# Patient Record
Sex: FEMALE | Race: WHITE | NOT HISPANIC OR LATINO | Employment: PART TIME | URBAN - METROPOLITAN AREA
[De-identification: names, ages, dates, MRNs, and addresses within clinical notes are randomized per-mention and may not be internally consistent; named-entity substitution may affect disease eponyms.]

---

## 2020-08-24 ENCOUNTER — APPOINTMENT (OUTPATIENT)
Dept: LAB | Facility: CLINIC | Age: 30
End: 2020-08-24

## 2020-08-24 ENCOUNTER — OCCMED (OUTPATIENT)
Dept: URGENT CARE | Facility: CLINIC | Age: 30
End: 2020-08-24

## 2020-08-24 DIAGNOSIS — Z11.59 SCREENING FOR VIRAL DISEASE: Primary | ICD-10-CM

## 2020-08-24 DIAGNOSIS — Z11.59 SCREENING FOR VIRAL DISEASE: ICD-10-CM

## 2020-08-24 PROCEDURE — 86480 TB TEST CELL IMMUN MEASURE: CPT

## 2020-08-24 PROCEDURE — 36415 COLL VENOUS BLD VENIPUNCTURE: CPT

## 2020-08-24 PROCEDURE — 86787 VARICELLA-ZOSTER ANTIBODY: CPT

## 2020-08-25 LAB — VZV IGG SER IA-ACNC: NORMAL

## 2020-08-26 LAB
GAMMA INTERFERON BACKGROUND BLD IA-ACNC: 0.01 IU/ML
M TB IFN-G BLD-IMP: NEGATIVE
M TB IFN-G CD4+ BCKGRND COR BLD-ACNC: 0 IU/ML
M TB IFN-G CD4+ BCKGRND COR BLD-ACNC: 0 IU/ML
MITOGEN IGNF BCKGRD COR BLD-ACNC: >10 IU/ML

## 2020-09-18 ENCOUNTER — CONSULT (OUTPATIENT)
Dept: SURGERY | Facility: CLINIC | Age: 30
End: 2020-09-18
Payer: COMMERCIAL

## 2020-09-18 VITALS
DIASTOLIC BLOOD PRESSURE: 70 MMHG | SYSTOLIC BLOOD PRESSURE: 118 MMHG | WEIGHT: 135 LBS | HEART RATE: 67 BPM | TEMPERATURE: 97.3 F | HEIGHT: 62 IN | BODY MASS INDEX: 24.84 KG/M2

## 2020-09-18 DIAGNOSIS — L02.214 ABSCESS OF RIGHT GROIN: Primary | ICD-10-CM

## 2020-09-18 PROCEDURE — 99205 OFFICE O/P NEW HI 60 MIN: CPT | Performed by: SURGERY

## 2020-09-18 RX ORDER — DEXTROAMPHETAMINE SACCHARATE, AMPHETAMINE ASPARTATE, DEXTROAMPHETAMINE SULFATE AND AMPHETAMINE SULFATE 5; 5; 5; 5 MG/1; MG/1; MG/1; MG/1
20 TABLET ORAL DAILY
COMMUNITY
End: 2022-05-03

## 2020-09-18 RX ORDER — NORTRIPTYLINE HYDROCHLORIDE 10 MG/1
10 CAPSULE ORAL
COMMUNITY
End: 2021-12-16 | Stop reason: SDUPTHER

## 2020-09-18 NOTE — PROGRESS NOTES
Office Visit - General Surgery  Juancarlos Corrales MRN: [id number]  Encounter: [id number]    Assessment and Plan    Problem List Items Addressed This Visit        Other    Abscess of right groin - Primary     32yo F s/p lymph node excision in right groin on 9/1/20  Represented to her surgeon for aspiration of fluid collection, now with recurrent fluid collection/erythema    Plan:  - I&D and packing of wound in the office today  Procedure yielded 5cc of purely serous fluid  No indication of lymph leak or infected fluid  - Wound packed - to removed packing in 48 hours  Then shower daily, keep wound covered but no air tight  - Return to clinic in 1 week for wound check  Chief Complaint:  Juancarlos Corrales is a 27 y o  female who presents for Abscess    Subjective  Ms Dangelo Encarnacion is a 32yo F who underwent right groin lymph node excision in [place] on 9/1/20  She represented to her surgeon on 9/7 with a collection of fluid in the area that was aspirated in the office  She presents now with wound drainage, pain, and redness  She denies fevers/chills, and otherwise feels well  Past Medical History  Past Medical History:   Diagnosis Date    ADD (attention deficit disorder)     Gastric hypertonus     Lymphadenopathy, inguinal        Past Surgical History  Past Surgical History:   Procedure Laterality Date    SUPERFICIAL LYMPH NODE BIOPSY / EXCISION Right     Right groin       Family History  Family History   Problem Relation Age of Onset    No Known Problems Mother     No Known Problems Father        Medications  Current Outpatient Medications on File Prior to Visit   Medication Sig Dispense Refill    amphetamine-dextroamphetamine (ADDERALL) 20 mg tablet Take 20 mg by mouth daily      nortriptyline (PAMELOR) 10 mg capsule Take 10 mg by mouth daily at bedtime       No current facility-administered medications on file prior to visit          Allergies  Allergies   Allergen Reactions    Cefaclor        Review of Systems   Constitutional: Negative for chills, fatigue and fever  HENT: Negative for ear pain, facial swelling, sinus pressure and sinus pain  Eyes: Negative for pain  Respiratory: Negative for cough, shortness of breath and wheezing  Cardiovascular: Negative for chest pain  Gastrointestinal: Negative for abdominal pain, constipation, diarrhea, nausea and vomiting  Endocrine: Negative for cold intolerance and heat intolerance  Genitourinary: Negative for dysuria and flank pain  Right groin pain   Musculoskeletal: Negative for back pain and neck pain  Skin: Negative for wound  Neurological: Negative for syncope, facial asymmetry, light-headedness and numbness  Psychiatric/Behavioral: Negative for behavioral problems, confusion and suicidal ideas  Objective  Vitals:    09/18/20 1229   BP: 118/70   Pulse: 67   Temp: (!) 97 3 °F (36 3 °C)         Physical Exam  Vitals signs and nursing note reviewed  Exam conducted with a chaperone present  Constitutional:       General: She is not in acute distress  Appearance: Normal appearance  She is not toxic-appearing  HENT:      Head: Normocephalic and atraumatic  Mouth/Throat:      Mouth: Mucous membranes are moist    Eyes:      Extraocular Movements: Extraocular movements intact  Pupils: Pupils are equal, round, and reactive to light  Neck:      Musculoskeletal: Normal range of motion and neck supple  Cardiovascular:      Rate and Rhythm: Normal rate and regular rhythm  Pulses: Normal pulses  Pulmonary:      Effort: Pulmonary effort is normal  No respiratory distress  Breath sounds: Normal breath sounds  No wheezing  Abdominal:      General: There is no distension  Palpations: Abdomen is soft  There is no mass  Tenderness: There is no abdominal tenderness  There is no guarding or rebound  Hernia: No hernia is present            Comments: Right groin incision 3cm in length with surrounding mild erythema  Superior aspect of the incision open with some seropurulent drainage  Area of fluctuance appreciated below the incision   Musculoskeletal: Normal range of motion  General: No swelling or deformity  Right lower leg: No edema  Left lower leg: No edema  Skin:     General: Skin is warm and dry  Coloration: Skin is not jaundiced  Neurological:      General: No focal deficit present  Mental Status: She is alert and oriented to person, place, and time  Psychiatric:         Mood and Affect: Mood normal          Behavior: Behavior normal        Incision and Drainage    Date/Time: 9/18/2020 12:55 PM  Performed by: Estela Guzmán MD  Authorized by: Estela Guzmán MD     Patient location:  Clinic  Other Assisting Provider: Yes (comment) (Valerie Mccoy)    Consent:     Consent obtained:  Written    Consent given by:  Patient    Risks discussed:  Bleeding, infection, incomplete drainage and pain  Universal protocol:     Procedure explained and questions answered to patient or proxy's satisfaction: yes      Relevant documents present and verified: yes      Patient identity confirmed:  Verbally with patient  Location:     Type:  Surgical wound infection    Size:  3cm    Location: Right Groin  Pre-procedure details:     Skin preparation:  Chloraprep  Anesthesia (see MAR for exact dosages): Anesthesia method:  Local infiltration    Local anesthetic:  Lidocaine 1% w/o epi  Procedure details:     Complexity:  Simple    Needle aspiration: no      Incision types: Other (comment) (Old surgical incision opened)    Scalpel blade:  11    Approach:  Open    Incision depth:  Subcutaneous    Wound management:  Probed and deloculated and irrigated with saline    Drainage:  Serous    Drainage amount: 5cc  Packing materials:  1/4 in gauze  Post-procedure details:     Patient tolerance of procedure:   Tolerated well, no immediate complications

## 2020-09-18 NOTE — ASSESSMENT & PLAN NOTE
32yo F s/p lymph node excision in right groin on 9/1/20  Represented to her surgeon for aspiration of fluid collection, now with recurrent fluid collection/erythema    Plan:  - I&D and packing of wound in the office today  Procedure yielded 5cc of purely serous fluid  No indication of lymph leak or infected fluid  - Wound packed - to removed packing in 48 hours  Then shower daily, keep wound covered but no air tight  - Return to clinic in 1 week for wound check

## 2020-09-21 ENCOUNTER — TELEPHONE (OUTPATIENT)
Dept: SURGERY | Facility: CLINIC | Age: 30
End: 2020-09-21

## 2020-09-21 NOTE — TELEPHONE ENCOUNTER
Patient had I&D done on right groin wound  Today she called with concern that there was a little pus at the incision  I spoke w/ Dr Kina Welch and he stated that was fine & to have her shower w/ soap & water to clean area  She also stated she was feeling ache & would it be from the wound (infection) she was told to take Tylenol or Ibuprofen or see her PCP  Patient understood

## 2020-09-25 ENCOUNTER — OFFICE VISIT (OUTPATIENT)
Dept: SURGERY | Facility: CLINIC | Age: 30
End: 2020-09-25

## 2020-09-25 VITALS — HEIGHT: 62 IN | TEMPERATURE: 98 F | BODY MASS INDEX: 24.99 KG/M2 | HEART RATE: 112 BPM | WEIGHT: 135.8 LBS

## 2020-09-25 DIAGNOSIS — L02.214 ABSCESS OF RIGHT GROIN: Primary | ICD-10-CM

## 2020-09-25 PROCEDURE — 99024 POSTOP FOLLOW-UP VISIT: CPT | Performed by: SURGERY

## 2020-09-25 NOTE — PROGRESS NOTES
Office Visit - General Surgery  Radha Llanos MRN: [id number]  Encounter: [id number]    Assessment and Plan    Problem List Items Addressed This Visit        Other    Abscess of right groin - Primary     S/p I&D last week, improved  Plan:  - No longer need to dress the area, Keep open to air if possible  - No follow-up needed, can call with any questions or concerns  Chief Complaint:  Radha  is a 27 y o  female who presents for Follow-up (f/u I&D groin area )    Subjective  Ms Milan Hunter is doing well following I&D of her Right groin fluid collection last week in the office  She states that she had some drainage over the beginning half of the week that has dropped off  No significant redness in the area  Denies fevers or chills       Past Medical History  Past Medical History:   Diagnosis Date    ADD (attention deficit disorder)     Gastric hypertonus     Lymphadenopathy, inguinal        Past Surgical History  Past Surgical History:   Procedure Laterality Date    SUPERFICIAL LYMPH NODE BIOPSY / EXCISION Right     Right groin       Family History  Family History   Problem Relation Age of Onset    No Known Problems Mother     No Known Problems Father        Social History  Social History     Socioeconomic History    Marital status: /Civil Union     Spouse name: None    Number of children: None    Years of education: None    Highest education level: None   Occupational History    None   Social Needs    Financial resource strain: None    Food insecurity     Worry: None     Inability: None    Transportation needs     Medical: None     Non-medical: None   Tobacco Use    Smoking status: Never Smoker    Smokeless tobacco: Never Used   Substance and Sexual Activity    Alcohol use: Yes     Comment: Occassionally    Drug use: Never    Sexual activity: Yes   Lifestyle    Physical activity     Days per week: None     Minutes per session: None    Stress: None Relationships    Social connections     Talks on phone: None     Gets together: None     Attends Confucianist service: None     Active member of club or organization: None     Attends meetings of clubs or organizations: None     Relationship status: None    Intimate partner violence     Fear of current or ex partner: None     Emotionally abused: None     Physically abused: None     Forced sexual activity: None   Other Topics Concern    None   Social History Narrative    None        Medications  Current Outpatient Medications on File Prior to Visit   Medication Sig Dispense Refill    amphetamine-dextroamphetamine (ADDERALL) 20 mg tablet Take 20 mg by mouth daily      nortriptyline (PAMELOR) 10 mg capsule Take 10 mg by mouth daily at bedtime       No current facility-administered medications on file prior to visit  Allergies  Allergies   Allergen Reactions    Cefaclor        Review of Systems   Constitutional: Negative for chills, fatigue and fever  HENT: Negative for ear pain, facial swelling, sinus pressure and sinus pain  Eyes: Negative for pain  Respiratory: Negative for cough, shortness of breath and wheezing  Cardiovascular: Negative for chest pain  Gastrointestinal: Negative for abdominal pain, constipation, diarrhea, nausea and vomiting  Endocrine: Negative for cold intolerance and heat intolerance  Genitourinary: Negative for dysuria and flank pain  Musculoskeletal: Negative for back pain and neck pain  Skin: Negative for wound  Neurological: Negative for syncope, facial asymmetry, light-headedness and numbness  Psychiatric/Behavioral: Negative for behavioral problems, confusion and suicidal ideas  Objective  Vitals:    09/25/20 1126   Pulse: (!) 112   Temp: 98 °F (36 7 °C)       Physical Exam  Vitals signs and nursing note reviewed  Constitutional:       General: She is not in acute distress  Appearance: Normal appearance  She is not toxic-appearing     HENT: Head: Normocephalic and atraumatic  Mouth/Throat:      Mouth: Mucous membranes are moist    Eyes:      Extraocular Movements: Extraocular movements intact  Pupils: Pupils are equal, round, and reactive to light  Neck:      Musculoskeletal: Normal range of motion and neck supple  Cardiovascular:      Rate and Rhythm: Normal rate and regular rhythm  Pulses: Normal pulses  Pulmonary:      Effort: Pulmonary effort is normal  No respiratory distress  Breath sounds: Normal breath sounds  No wheezing  Abdominal:      General: There is no distension  Palpations: Abdomen is soft  There is no mass  Tenderness: There is no abdominal tenderness  There is no guarding or rebound  Hernia: No hernia is present  Genitourinary:     Comments: Right groin incision minimally open at the superior apex  No fluctuance felt  Mild amount of induration in the area, no erythema  Musculoskeletal: Normal range of motion  General: No swelling or deformity  Right lower leg: No edema  Left lower leg: No edema  Skin:     General: Skin is warm and dry  Coloration: Skin is not jaundiced  Neurological:      General: No focal deficit present  Mental Status: She is alert and oriented to person, place, and time     Psychiatric:         Mood and Affect: Mood normal          Behavior: Behavior normal

## 2020-09-25 NOTE — ASSESSMENT & PLAN NOTE
S/p I&D last week, improved  Plan:  - No longer need to dress the area, Keep open to air if possible  - No follow-up needed, can call with any questions or concerns

## 2020-10-05 ENCOUNTER — TELEPHONE (OUTPATIENT)
Dept: SURGERY | Facility: CLINIC | Age: 30
End: 2020-10-05

## 2020-10-06 ENCOUNTER — OFFICE VISIT (OUTPATIENT)
Dept: SURGERY | Facility: CLINIC | Age: 30
End: 2020-10-06

## 2020-10-06 VITALS — HEART RATE: 74 BPM | BODY MASS INDEX: 25.06 KG/M2 | TEMPERATURE: 97.4 F | WEIGHT: 137 LBS

## 2020-10-06 DIAGNOSIS — L02.214 ABSCESS OF RIGHT GROIN: Primary | ICD-10-CM

## 2020-10-06 PROCEDURE — 99024 POSTOP FOLLOW-UP VISIT: CPT | Performed by: SURGERY

## 2020-12-21 ENCOUNTER — IMMUNIZATIONS (OUTPATIENT)
Dept: FAMILY MEDICINE CLINIC | Facility: HOSPITAL | Age: 30
End: 2020-12-21
Payer: COMMERCIAL

## 2020-12-21 DIAGNOSIS — Z23 ENCOUNTER FOR IMMUNIZATION: ICD-10-CM

## 2020-12-21 PROCEDURE — 91300 SARS-COV-2 / COVID-19 MRNA VACCINE (PFIZER-BIONTECH) 30 MCG: CPT

## 2020-12-21 PROCEDURE — 0001A SARS-COV-2 / COVID-19 MRNA VACCINE (PFIZER-BIONTECH) 30 MCG: CPT

## 2021-01-09 ENCOUNTER — IMMUNIZATIONS (OUTPATIENT)
Dept: FAMILY MEDICINE CLINIC | Facility: HOSPITAL | Age: 31
End: 2021-01-09

## 2021-01-09 DIAGNOSIS — Z23 ENCOUNTER FOR IMMUNIZATION: ICD-10-CM

## 2021-01-09 PROCEDURE — 0002A SARS-COV-2 / COVID-19 MRNA VACCINE (PFIZER-BIONTECH) 30 MCG: CPT

## 2021-01-09 PROCEDURE — 91300 SARS-COV-2 / COVID-19 MRNA VACCINE (PFIZER-BIONTECH) 30 MCG: CPT

## 2021-10-07 ENCOUNTER — IMMUNIZATIONS (OUTPATIENT)
Dept: FAMILY MEDICINE CLINIC | Facility: HOSPITAL | Age: 31
End: 2021-10-07

## 2021-10-07 DIAGNOSIS — Z23 ENCOUNTER FOR IMMUNIZATION: Primary | ICD-10-CM

## 2021-10-07 PROCEDURE — 0001A SARS-COV-2 / COVID-19 MRNA VACCINE (PFIZER-BIONTECH) 30 MCG: CPT

## 2021-10-07 PROCEDURE — 91300 SARS-COV-2 / COVID-19 MRNA VACCINE (PFIZER-BIONTECH) 30 MCG: CPT

## 2021-10-19 ENCOUNTER — APPOINTMENT (EMERGENCY)
Dept: RADIOLOGY | Facility: HOSPITAL | Age: 31
End: 2021-10-19
Payer: COMMERCIAL

## 2021-10-19 ENCOUNTER — HOSPITAL ENCOUNTER (EMERGENCY)
Facility: HOSPITAL | Age: 31
Discharge: HOME | End: 2021-10-19
Attending: EMERGENCY MEDICINE | Admitting: EMERGENCY MEDICINE
Payer: COMMERCIAL

## 2021-10-19 VITALS
HEART RATE: 80 BPM | DIASTOLIC BLOOD PRESSURE: 77 MMHG | RESPIRATION RATE: 16 BRPM | TEMPERATURE: 98.7 F | SYSTOLIC BLOOD PRESSURE: 126 MMHG | OXYGEN SATURATION: 100 %

## 2021-10-19 DIAGNOSIS — R42 DIZZINESS: ICD-10-CM

## 2021-10-19 DIAGNOSIS — Z87.19 HISTORY OF IBS: ICD-10-CM

## 2021-10-19 DIAGNOSIS — R07.9 CHEST PAIN: Primary | ICD-10-CM

## 2021-10-19 LAB
ALBUMIN SERPL BCP-MCNC: 4.3 G/DL (ref 3.5–5)
ALP SERPL-CCNC: 58 U/L (ref 46–116)
ALT SERPL W P-5'-P-CCNC: 24 U/L (ref 12–78)
ANION GAP SERPL CALCULATED.3IONS-SCNC: 11 MMOL/L (ref 4–13)
AST SERPL W P-5'-P-CCNC: 22 U/L (ref 5–45)
BASOPHILS # BLD AUTO: 0.06 THOUSANDS/ΜL (ref 0–0.1)
BASOPHILS NFR BLD AUTO: 1 % (ref 0–1)
BILIRUB SERPL-MCNC: 0.93 MG/DL (ref 0.2–1)
BUN SERPL-MCNC: 11 MG/DL (ref 5–25)
CALCIUM SERPL-MCNC: 8.9 MG/DL (ref 8.3–10.1)
CHLORIDE SERPL-SCNC: 105 MMOL/L (ref 100–108)
CO2 SERPL-SCNC: 25 MMOL/L (ref 21–32)
CREAT SERPL-MCNC: 0.66 MG/DL (ref 0.6–1.3)
D DIMER PPP FEU-MCNC: <0.27 UG/ML FEU
EOSINOPHIL # BLD AUTO: 0.21 THOUSAND/ΜL (ref 0–0.61)
EOSINOPHIL NFR BLD AUTO: 4 % (ref 0–6)
ERYTHROCYTE [DISTWIDTH] IN BLOOD BY AUTOMATED COUNT: 12.8 % (ref 11.6–15.1)
EXT PREG TEST URINE: NEGATIVE
EXT. CONTROL ED NAV: NORMAL
GFR SERPL CREATININE-BSD FRML MDRD: 118 ML/MIN/1.73SQ M
GLUCOSE SERPL-MCNC: 92 MG/DL (ref 65–140)
HCT VFR BLD AUTO: 41.1 % (ref 34.8–46.1)
HGB BLD-MCNC: 13.7 G/DL (ref 11.5–15.4)
IMM GRANULOCYTES # BLD AUTO: 0.01 THOUSAND/UL (ref 0–0.2)
IMM GRANULOCYTES NFR BLD AUTO: 0 % (ref 0–2)
LYMPHOCYTES # BLD AUTO: 1.98 THOUSANDS/ΜL (ref 0.6–4.47)
LYMPHOCYTES NFR BLD AUTO: 33 % (ref 14–44)
MCH RBC QN AUTO: 29.1 PG (ref 26.8–34.3)
MCHC RBC AUTO-ENTMCNC: 33.3 G/DL (ref 31.4–37.4)
MCV RBC AUTO: 87 FL (ref 82–98)
MONOCYTES # BLD AUTO: 0.33 THOUSAND/ΜL (ref 0.17–1.22)
MONOCYTES NFR BLD AUTO: 5 % (ref 4–12)
NEUTROPHILS # BLD AUTO: 3.47 THOUSANDS/ΜL (ref 1.85–7.62)
NEUTS SEG NFR BLD AUTO: 57 % (ref 43–75)
NRBC BLD AUTO-RTO: 0 /100 WBCS
PLATELET # BLD AUTO: 316 THOUSANDS/UL (ref 149–390)
PMV BLD AUTO: 8.8 FL (ref 8.9–12.7)
POTASSIUM SERPL-SCNC: 3.8 MMOL/L (ref 3.5–5.3)
PROT SERPL-MCNC: 7.5 G/DL (ref 6.4–8.2)
RBC # BLD AUTO: 4.71 MILLION/UL (ref 3.81–5.12)
SODIUM SERPL-SCNC: 141 MMOL/L (ref 136–145)
TROPONIN I SERPL-MCNC: <0.02 NG/ML
WBC # BLD AUTO: 6.06 THOUSAND/UL (ref 4.31–10.16)

## 2021-10-19 PROCEDURE — 99285 EMERGENCY DEPT VISIT HI MDM: CPT

## 2021-10-19 PROCEDURE — 80053 COMPREHEN METABOLIC PANEL: CPT | Performed by: EMERGENCY MEDICINE

## 2021-10-19 PROCEDURE — U0005 INFEC AGEN DETEC AMPLI PROBE: HCPCS | Performed by: EMERGENCY MEDICINE

## 2021-10-19 PROCEDURE — U0003 INFECTIOUS AGENT DETECTION BY NUCLEIC ACID (DNA OR RNA); SEVERE ACUTE RESPIRATORY SYNDROME CORONAVIRUS 2 (SARS-COV-2) (CORONAVIRUS DISEASE [COVID-19]), AMPLIFIED PROBE TECHNIQUE, MAKING USE OF HIGH THROUGHPUT TECHNOLOGIES AS DESCRIBED BY CMS-2020-01-R: HCPCS | Performed by: EMERGENCY MEDICINE

## 2021-10-19 PROCEDURE — 85025 COMPLETE CBC W/AUTO DIFF WBC: CPT | Performed by: EMERGENCY MEDICINE

## 2021-10-19 PROCEDURE — 85379 FIBRIN DEGRADATION QUANT: CPT | Performed by: EMERGENCY MEDICINE

## 2021-10-19 PROCEDURE — 71045 X-RAY EXAM CHEST 1 VIEW: CPT

## 2021-10-19 PROCEDURE — 36415 COLL VENOUS BLD VENIPUNCTURE: CPT

## 2021-10-19 PROCEDURE — 84484 ASSAY OF TROPONIN QUANT: CPT | Performed by: EMERGENCY MEDICINE

## 2021-10-19 PROCEDURE — 99285 EMERGENCY DEPT VISIT HI MDM: CPT | Performed by: EMERGENCY MEDICINE

## 2021-10-19 PROCEDURE — 93005 ELECTROCARDIOGRAM TRACING: CPT

## 2021-10-19 PROCEDURE — 81025 URINE PREGNANCY TEST: CPT | Performed by: EMERGENCY MEDICINE

## 2021-10-19 RX ORDER — NORTRIPTYLINE HYDROCHLORIDE 10 MG/1
10 CAPSULE ORAL
Qty: 30 CAPSULE | Refills: 0 | Status: SHIPPED | OUTPATIENT
Start: 2021-10-19 | End: 2021-12-16 | Stop reason: SDUPTHER

## 2021-10-20 LAB
ATRIAL RATE: 81 BPM
P AXIS: 99 DEGREES
PR INTERVAL: 150 MS
QRS AXIS: 65 DEGREES
QRSD INTERVAL: 80 MS
QT INTERVAL: 362 MS
QTC INTERVAL: 407 MS
SARS-COV-2 RNA RESP QL NAA+PROBE: NEGATIVE
T WAVE AXIS: 73 DEGREES
VENTRICULAR RATE: 76 BPM

## 2021-10-20 PROCEDURE — 93010 ELECTROCARDIOGRAM REPORT: CPT | Performed by: INTERNAL MEDICINE

## 2021-12-16 ENCOUNTER — OFFICE VISIT (OUTPATIENT)
Dept: GASTROENTEROLOGY | Facility: CLINIC | Age: 31
End: 2021-12-16
Payer: COMMERCIAL

## 2021-12-16 VITALS
HEART RATE: 91 BPM | WEIGHT: 132 LBS | BODY MASS INDEX: 24.29 KG/M2 | HEIGHT: 62 IN | SYSTOLIC BLOOD PRESSURE: 137 MMHG | DIASTOLIC BLOOD PRESSURE: 89 MMHG

## 2021-12-16 DIAGNOSIS — K59.1 FUNCTIONAL DIARRHEA: ICD-10-CM

## 2021-12-16 DIAGNOSIS — K62.5 RECTAL BLEEDING: Primary | ICD-10-CM

## 2021-12-16 DIAGNOSIS — K22.89 ESOPHAGEAL PAIN: ICD-10-CM

## 2021-12-16 DIAGNOSIS — Z80.0 FAMILY HISTORY OF COLON CANCER: ICD-10-CM

## 2021-12-16 PROCEDURE — 99214 OFFICE O/P EST MOD 30 MIN: CPT | Performed by: INTERNAL MEDICINE

## 2021-12-16 RX ORDER — NORTRIPTYLINE HYDROCHLORIDE 10 MG/1
10 CAPSULE ORAL
Qty: 90 CAPSULE | Refills: 3 | Status: SHIPPED | OUTPATIENT
Start: 2021-12-16

## 2022-01-04 ENCOUNTER — TELEPHONE (OUTPATIENT)
Dept: GASTROENTEROLOGY | Facility: CLINIC | Age: 32
End: 2022-01-04

## 2022-01-18 DIAGNOSIS — K22.89 ESOPHAGEAL PAIN: ICD-10-CM

## 2022-01-18 NOTE — TELEPHONE ENCOUNTER
PT WITH RECENT OFFICE VISIT, REQUESTING REFILL FOR NORTRIPTYLINE 10MG DAILY AT  TO [place] IN [place]  SPOKE WITH PT PHARMACY, SHE DOES HAVE REFILLS AVAILABLE, PT INFORMED THEY WILL PREPARE A REFILL FOR HER

## 2022-01-27 ENCOUNTER — LAB (OUTPATIENT)
Dept: LAB | Facility: HOSPITAL | Age: 32
End: 2022-01-27
Attending: INTERNAL MEDICINE
Payer: COMMERCIAL

## 2022-01-27 DIAGNOSIS — K59.1 FUNCTIONAL DIARRHEA: ICD-10-CM

## 2022-01-27 PROCEDURE — 86364 TISS TRNSGLTMNASE EA IG CLAS: CPT

## 2022-01-27 PROCEDURE — 86231 EMA EACH IG CLASS: CPT

## 2022-01-27 PROCEDURE — 86258 DGP ANTIBODY EACH IG CLASS: CPT

## 2022-01-27 PROCEDURE — 82784 ASSAY IGA/IGD/IGG/IGM EACH: CPT

## 2022-01-27 PROCEDURE — 36415 COLL VENOUS BLD VENIPUNCTURE: CPT

## 2022-01-28 LAB
ENDOMYSIUM IGA SER QL: NEGATIVE
GLIADIN PEPTIDE IGA SER-ACNC: 4 UNITS (ref 0–19)
GLIADIN PEPTIDE IGG SER-ACNC: 2 UNITS (ref 0–19)
IGA SERPL-MCNC: 155 MG/DL (ref 87–352)
TTG IGA SER-ACNC: <2 U/ML (ref 0–3)
TTG IGG SER-ACNC: <2 U/ML (ref 0–5)

## 2022-02-04 ENCOUNTER — HOSPITAL ENCOUNTER (OUTPATIENT)
Dept: GASTROENTEROLOGY | Facility: AMBULATORY SURGERY CENTER | Age: 32
Discharge: HOME | End: 2022-02-04
Payer: COMMERCIAL

## 2022-02-04 ENCOUNTER — ANESTHESIA (OUTPATIENT)
Dept: GASTROENTEROLOGY | Facility: AMBULATORY SURGERY CENTER | Age: 32
End: 2022-02-04

## 2022-02-04 ENCOUNTER — ANESTHESIA EVENT (OUTPATIENT)
Dept: GASTROENTEROLOGY | Facility: AMBULATORY SURGERY CENTER | Age: 32
End: 2022-02-04

## 2022-02-04 VITALS
HEIGHT: 62 IN | TEMPERATURE: 98.1 F | OXYGEN SATURATION: 99 % | HEART RATE: 65 BPM | WEIGHT: 136 LBS | SYSTOLIC BLOOD PRESSURE: 116 MMHG | BODY MASS INDEX: 25.03 KG/M2 | DIASTOLIC BLOOD PRESSURE: 73 MMHG | RESPIRATION RATE: 18 BRPM

## 2022-02-04 DIAGNOSIS — K62.5 RECTAL BLEEDING: ICD-10-CM

## 2022-02-04 DIAGNOSIS — K22.89 ESOPHAGEAL PAIN: ICD-10-CM

## 2022-02-04 LAB
EXT PREGNANCY TEST URINE: NEGATIVE
EXT. CONTROL: NORMAL

## 2022-02-04 PROCEDURE — 45380 COLONOSCOPY AND BIOPSY: CPT | Performed by: INTERNAL MEDICINE

## 2022-02-04 PROCEDURE — 00813 ANES UPR LWR GI NDSC PX: CPT | Performed by: NURSE ANESTHETIST, CERTIFIED REGISTERED

## 2022-02-04 PROCEDURE — 43239 EGD BIOPSY SINGLE/MULTIPLE: CPT | Performed by: INTERNAL MEDICINE

## 2022-02-04 RX ORDER — LIDOCAINE HYDROCHLORIDE 10 MG/ML
INJECTION, SOLUTION EPIDURAL; INFILTRATION; INTRACAUDAL; PERINEURAL AS NEEDED
Status: DISCONTINUED | OUTPATIENT
Start: 2022-02-04 | End: 2022-02-04

## 2022-02-04 RX ORDER — SODIUM CHLORIDE 9 MG/ML
125 INJECTION, SOLUTION INTRAVENOUS CONTINUOUS
Status: DISCONTINUED | OUTPATIENT
Start: 2022-02-04 | End: 2022-02-04

## 2022-02-04 RX ORDER — SODIUM CHLORIDE 9 MG/ML
50 INJECTION, SOLUTION INTRAVENOUS CONTINUOUS
Status: DISCONTINUED | OUTPATIENT
Start: 2022-02-04 | End: 2022-02-08 | Stop reason: HOSPADM

## 2022-02-04 RX ORDER — PROPOFOL 10 MG/ML
INJECTION, EMULSION INTRAVENOUS AS NEEDED
Status: DISCONTINUED | OUTPATIENT
Start: 2022-02-04 | End: 2022-02-04

## 2022-02-04 RX ADMIN — PROPOFOL 100 MG: 10 INJECTION, EMULSION INTRAVENOUS at 11:01

## 2022-02-04 RX ADMIN — LIDOCAINE HYDROCHLORIDE 100 MG: 10 INJECTION, SOLUTION EPIDURAL; INFILTRATION; INTRACAUDAL; PERINEURAL at 10:34

## 2022-02-04 RX ADMIN — SODIUM CHLORIDE: 9 INJECTION, SOLUTION INTRAVENOUS at 10:29

## 2022-02-04 RX ADMIN — PROPOFOL 50 MG: 10 INJECTION, EMULSION INTRAVENOUS at 10:51

## 2022-02-04 RX ADMIN — PROPOFOL 100 MG: 10 INJECTION, EMULSION INTRAVENOUS at 10:45

## 2022-02-04 RX ADMIN — PROPOFOL 150 MG: 10 INJECTION, EMULSION INTRAVENOUS at 10:34

## 2022-02-04 RX ADMIN — PROPOFOL 100 MG: 10 INJECTION, EMULSION INTRAVENOUS at 10:40

## 2022-02-04 NOTE — ANESTHESIA PREPROCEDURE EVALUATION
Procedure:  COLONOSCOPY  EGD    Relevant Problems   Other   (+) Lymphadenopathy, inguinal        Physical Exam    Airway    Mallampati score: II  TM Distance: >3 FB  Neck ROM: full     Dental   No notable dental hx     Cardiovascular  Rhythm: regular, Rate: normal, Cardiovascular exam normal    Pulmonary  Pulmonary exam normal     Other Findings        Anesthesia Plan  ASA Score- 2     Anesthesia Type- IV sedation with anesthesia with ASA Monitors  Additional Monitors:   Airway Plan:           Plan Factors-Exercise tolerance (METS): >4 METS  Chart reviewed  Patient summary reviewed  Patient is not a current smoker  Patient not instructed to abstain from smoking on day of procedure  Patient did not smoke on day of surgery  Induction-     Postoperative Plan-     Informed Consent- Anesthetic plan and risks discussed with patient

## 2022-02-04 NOTE — ANESTHESIA POSTPROCEDURE EVALUATION
Post-Op Assessment Note    CV Status:  Stable  Pain Score: 0    Pain management: adequate     Mental Status:  Alert and awake   Hydration Status:  Euvolemic   PONV Controlled:  Controlled   Airway Patency:  Patent      Post Op Vitals Reviewed: Yes      Staff: CRNA         No complications documented      BP   110/58   Temp      Pulse  75   Resp   18   SpO2   99

## 2022-02-04 NOTE — H&P
History and Physical - [facility]  Andrea Waltonceline 32 y o  female MRN: [id number]                  HPI: Joselyn Cisse is a 32y o  year old female who presents for EGD and colonoscopy for history of esophageal pain as well as rectal bleeding  REVIEW OF SYSTEMS: Per the HPI, and otherwise unremarkable      Historical Information   Past Medical History:   Diagnosis Date    ADD (attention deficit disorder)     Anxiety     Asthma     seasonal, with URI, and cold weather excercise    Colon polyp     Depression     Epigastric pain     occasional nausea    Esophageal pain     occasional    Gastric hypertonus     GERD (gastroesophageal reflux disease)     occasional    Lymphadenopathy, inguinal     PONV (postoperative nausea and vomiting)     Raynaud's disease     Rectal bleeding      Past Surgical History:   Procedure Laterality Date    BUNIONECTOMY      COLONOSCOPY      SUPERFICIAL LYMPH NODE BIOPSY / EXCISION Right     Right groin    UPPER GASTROINTESTINAL ENDOSCOPY       Social History   Social History     Substance and Sexual Activity   Alcohol Use Yes    Comment: occasional     Social History     Substance and Sexual Activity   Drug Use Yes    Types: Marijuana    Comment: medical marijuana-flower not oil     Social History     Tobacco Use   Smoking Status Current Some Day Smoker    Types: Cigars   Smokeless Tobacco Never Used   Tobacco Comment    no cigar today     Family History   Problem Relation Age of Onset    Colon polyps Mother     No Known Problems Father     Colon cancer Maternal Grandfather        Meds/Allergies       Current Outpatient Medications:     Ascorbic Acid (VITAMIN C PO)    Calcium Carb-Cholecalciferol (CALCIUM+D3 PO)    lisdexamfetamine (VYVANSE) 30 MG capsule    nortriptyline (PAMELOR) 10 mg capsule    Omega-3 Fatty Acids (SV FISH OIL PO)    Probiotic Product (PROBIOTIC-10 PO)    VITAMIN A PO    amphetamine-dextroamphetamine (ADDERALL) 20 mg tablet    Current Facility-Administered Medications:     sodium chloride 0 9 % infusion, 125 mL/hr, Intravenous, Continuous    Allergies   Allergen Reactions    Cefaclor Hives     Hives around mouth       Objective     /66   Pulse 81   Temp 98 1 °F (36 7 °C) (Skin)   Resp 18   Ht 5' 2" (1 575 m)   Wt 61 7 kg (136 lb)   LMP 01/07/2022 (Approximate)   SpO2 100%   BMI 24 87 kg/m²       PHYSICAL EXAM    Gen: NAD  Head: NCAT  CV: RRR  CHEST: Clear  ABD: soft, NT/ND  EXT: no edema      ASSESSMENT/PLAN:  This is a 32y o  year old female here for EGD and colonoscopy, and she is stable and optimized for her procedure

## 2022-02-04 NOTE — DISCHARGE INSTRUCTIONS
Upper Endoscopy and Colonoscopy   WHAT YOU NEED TO KNOW:   An upper endoscopy is also called an upper gastrointestinal (GI) endoscopy, or an esophagogastroduodenoscopy (EGD)  It is a procedure to examine the inside of your esophagus, stomach, and duodenum (first part of the small intestine) with a scope  You may feel bloated, gassy, or have some abdominal discomfort after your procedure  Your throat may be sore for 24 to 36 hours  You may burp or pass gas from air that is still inside your body  A colonoscopy is a procedure to examine the inside of your colon (intestine) with a scope  Polyps or tissue growths may have been removed during your colonoscopy  It is normal to feel bloated and to have some abdominal discomfort  You should be passing gas  If you have hemorrhoids or you had polyps removed, you may have a small amount of bleeding  DISCHARGE INSTRUCTIONS:   Seek care immediately if:   · You have sudden, severe abdominal pain  · You have problems swallowing  · You have a large amount of black, sticky bowel movements or blood in your bowel movements  · You have sudden trouble breathing  · You feel weak, lightheaded, or faint or your heart beats faster than normal for you  Contact your healthcare provider if:   · You have a fever and chills  · You have nausea or are vomiting  · Your abdomen is bloated or feels full and hard  · You have abdominal pain  · You have a large amount of black, sticky bowel movements or blood in your bowel movements  · You have not had a bowel movement for 3 days after your procedure  · You have rash or hives  · You have questions or concerns about your procedure  Activity:   ·       Do not lift, strain, or run for 24 hours after your procedure  ·       Rest after your procedure  You have been given medicine to relax you  Do not drive or make important decisions until the day after your procedure   Return to your normal activity as directed  ·       Relieve gas and discomfort from bloating by lying on your right side with a heating pad on your abdomen  You may need to take short walks to help the gas move out  Eat small meals until bloating is relieved  Follow up with your healthcare provider as directed: Write down your questions so you remember to ask them during your visits  If you take a blood thinner, please review the specific instructions from your endoscopist about when you should resume it  These can be found in the Recommendation and Your Medication list sections of this After Visit Summary  GERD (Gastroesophageal Reflux Disease)   WHAT YOU NEED TO KNOW:   What is gastroesophageal reflux disease (GERD)? GERD is reflux that occurs more than twice a week for a few weeks  Reflux means acid and food in the stomach back up into the esophagus  It usually causes heartburn and other symptoms  GERD can cause other health problems over time if it is not treated  What causes GERD? GERD often occurs when the lower muscle (sphincter) of the esophagus does not close properly  The sphincter normally opens to let food into the stomach  It then closes to keep food and stomach acid in the stomach  If the sphincter does not close properly, stomach acid and food back up (reflux) into the esophagus  The following may increase your risk for GERD:  · Certain foods such as spicy foods, chocolate, foods that contain caffeine, peppermint, and fried foods    · Hiatal hernia    · Certain medicines such as calcium channel blockers (used to treat high blood pressure), allergy medicines, sedatives, or antidepressants    · Pregnancy or obesity    · Lying down after a meal    · Drinking alcohol or smoking    What are the signs and symptoms of GERD?    · Heartburn (burning pain in your chest)    · Pain after meals that spreads to your neck, jaw, or shoulder    · Pain that gets better when you change positions    · Bitter or acid taste in your mouth    · A dry cough    · Trouble swallowing or pain with swallowing    · Hoarseness or a sore throat    · Burping or hiccups    · Feeling full soon after you start eating    How is GERD diagnosed? Your healthcare provider will ask about your symptoms and when they started  Tell him or her about other medical conditions you have, your eating habits, and your activities  You may also need any of the following:  · The amount of acid  in your esophagus and stomach may be checked  A small probe is used to check the amount  · An endoscopy  is a procedure used to look at the inside of your esophagus and stomach  An endoscope is a bendable tube with a light and camera on the end  Your healthcare provider may remove a small sample of tissue and send it to a lab for tests  · X-ray  pictures may be taken of your stomach and intestines (bowel)  You may be given a chalky liquid to drink before the pictures are taken  This liquid helps your stomach and intestines show up better on the x-rays  · Pressure and function  tests of your esophagus can help find problems such as a hiatal hernia  How is GERD treated? · Medicines  are used to decrease stomach acid  Medicine may also be used to help your lower esophageal sphincter and stomach contract (tighten) more  · Surgery  is done to wrap the upper part of the stomach around the esophageal sphincter  This will strengthen the sphincter and prevent reflux  How can I manage GERD? · Do not have foods or drinks that may increase heartburn  These include chocolate, peppermint, fried or fatty foods, drinks that contain caffeine, or carbonated drinks (soda)  Other foods include spicy foods, onions, tomatoes, and tomato-based foods  Do not have foods or drinks that can irritate your esophagus, such as citrus fruits, juices, and alcohol  · Do not eat large meals    When you eat a lot of food at one time, your stomach needs more acid to digest it  Eat 6 small meals each day instead of 3 large ones, and eat slowly  Do not eat meals 2 to 3 hours before bedtime  · Elevate the head of your bed  Place 6-inch blocks under the head of your bed frame  You may also use more than one pillow under your head and shoulders while you sleep  · Maintain a healthy weight  If you are overweight, weight loss may help relieve symptoms of GERD  · Do not smoke  Smoking weakens the lower esophageal sphincter and increases the risk of GERD  Ask your healthcare provider for information if you currently smoke and need help to quit  E-cigarettes or smokeless tobacco still contain nicotine  Talk to your healthcare provider before you use these products  · Do not wear clothing that is tight around your waist   Tight clothing can put pressure on your stomach and cause or worsen GERD symptoms  Call your local emergency number (911 in the [address] [facility]) if:   · You have severe chest pain and sudden trouble breathing  When should I seek immediate care? · You have trouble breathing after you vomit  · You have trouble swallowing, or pain with swallowing  · Your bowel movements are black, bloody, or tarry-looking  · Your vomit looks like coffee grounds or has blood in it  When should I call my doctor? · You feel full and cannot burp or vomit  · You vomit large amounts, or you vomit often  · You are losing weight without trying  · Your symptoms get worse or do not improve with treatment  · You have questions or concerns about your condition or care  CARE AGREEMENT:   You have the right to help plan your care  Learn about your health condition and how it may be treated  Discuss treatment options with your healthcare providers to decide what care you want to receive  You always have the right to refuse treatment  The above information is an  only   It is not intended as medical advice for individual conditions or treatments  Talk to your doctor, nurse or pharmacist before following any medical regimen to see if it is safe and effective for you  © Copyright [employer] 2021 Information is for End User's use only and may not be sold, redistributed or otherwise used for commercial purposes  All illustrations and images included in CareNotes® are the copyrighted property of Orlin DAMON  or [facility] Fiber Diet   WHAT YOU NEED TO KNOW:   What is a high-fiber diet? A high-fiber diet includes foods that have a high amount of fiber  Fiber is the part of fruits, vegetables, and grains that is not broken down by your body  Fiber keeps your bowel movements regular  Fiber can also help lower your cholesterol level, control blood sugar in people with diabetes, and relieve constipation  Fiber can also help you control your weight because it helps you feel full faster  Most adults should eat 25 to 35 grams of fiber each day  Talk to your dietitian or healthcare provider about the amount of fiber you need  What foods are good sources of fiber? · Foods with at least 4 grams of fiber per serving:      ? ? to ½ cup of high-fiber cereal (check the nutrition label on the box)    ? ½ cup of blackberries or raspberries    ? 4 dried prunes    ? 1 cooked artichoke    ? ½ cup of cooked legumes, such as lentils, or red, kidney, and quesada beans    · Foods with 1 to 3 grams of fiber per serving:      ? 1 slice of whole-wheat, pumpernickel, or rye bread    ? ½ cup of cooked brown rice    ? 4 whole-wheat crackers    ? 1 cup of oatmeal    ? ½ cup of cereal with 1 to 3 grams of fiber per serving (check the nutrition label on the box)    ? 1 small piece of fruit, such as an apple, banana, pear, kiwi, or orange    ? 3 dates    ? ½ cup of canned apricots, fruit cocktail, peaches, or pears    ?  ½ cup of raw or cooked vegetables, such as carrots, cauliflower, cabbage, spinach, squash, or corn    What are some ways that I can increase fiber in my diet? · Choose brown or wild rice instead of white rice  · Use whole wheat flour in recipes instead of white or all-purpose flour  · Add beans and peas to casseroles or soups  · Choose fresh fruit and vegetables with peels or skins on instead of juices  What other guidelines should I follow? · Add fiber to your diet slowly  You may have abdominal discomfort, bloating, and gas if you add fiber to your diet too quickly  · Drink plenty of liquids as you add fiber to your diet  You may have nausea or develop constipation if you do not drink enough water  Ask how much liquid to drink each day and which liquids are best for you  CARE AGREEMENT:   You have the right to help plan your care  Discuss treatment options with your healthcare provider to decide what care you want to receive  You always have the right to refuse treatment  The above information is an  only  It is not intended as medical advice for individual conditions or treatments  Talk to your doctor, nurse or pharmacist before following any medical regimen to see if it is safe and effective for you  © Copyright "[employer] 2021 Information is for End User's use only and may not be sold, redistributed or otherwise used for commercial purposes  All illustrations and images included in CareNotes® are the copyrighted property of [employer]  or [facility] Crissy Ruiz   Hemorrhoids   WHAT YOU NEED TO KNOW:   What are hemorrhoids? Hemorrhoids are swollen blood vessels inside your rectum (internal hemorrhoids) or on your anus (external hemorrhoids)  Sometimes a hemorrhoid may prolapse  This means it extends out of your anus  What increases my risk for hemorrhoids?    · Pregnancy or obesity    · Straining or sitting for a long time during bowel movements    · Liver disease    · Weak muscles around the anus caused by older age, rectal surgery, or anal intercourse    · A lack of physical activity    · Chronic diarrhea or constipation    · A low-fiber diet    What are the signs and symptoms of hemorrhoids? · Pain or itching around your anus or inside your rectum    · Swelling or bumps around your anus    · Bright red blood in your bowel movement, on the toilet paper, or in the toilet bowl    · Tissue bulging out of your anus (prolapsed hemorrhoids)    · Incontinence (poor control over urine or bowel movements)    How are hemorrhoids diagnosed? Your healthcare provider will ask about your symptoms, the foods you eat, and your bowel movements  He or she will examine your anus for external hemorrhoids  You may need the following:  · A digital rectal exam  is a test to check for hemorrhoids  Your healthcare provider will put a gloved finger inside your anus to feel for the hemorrhoids  · An anoscopy  is a test that uses a scope (small tube with a light and camera on the end) to look at your hemorrhoids  How are hemorrhoids treated? Treatment will depend on your symptoms  You may need any of the following:  · Medicines  can help decrease pain and swelling, and soften your bowel movement  The medicine may be a pill, pad, cream, or ointment  · Procedures  may be used to shrink or remove your hemorrhoid  Examples include rubber-band ligation, sclerotherapy, and photocoagulation  These procedures may be done in your healthcare provider's office  Ask your healthcare provider for more information about these procedures  · Surgery  may be needed to shrink or remove your hemorrhoids  How can I manage my symptoms? · Apply ice on your anus for 15 to 20 minutes every hour or as directed  Use an ice pack, or put crushed ice in a plastic bag  Cover it with a towel before you apply it to your anus  Ice helps prevent tissue damage and decreases swelling and pain  · Take a sitz bath  Fill a bathtub with 4 to 6 inches of warm water   You may also use a sitz bath pan that fits inside a toilet jorge luis  Sit in the sitz bath for 15 minutes  Do this 3 times a day, and after each bowel movement  The warm water can help decrease pain and swelling  · Keep your anal area clean  Gently wash the area with warm water daily  Soap may irritate the area  After a bowel movement, wipe with moist towelettes or wet toilet paper  Dry toilet paper can irritate the area  How can I help prevent hemorrhoids? · Do not strain to have a bowel movement  Do not sit on the toilet too long  These actions can increase pressure on the tissues in your rectum and anus  · Drink plenty of liquids  Liquids can help prevent constipation  Ask how much liquid to drink each day and which liquids are best for you  · Eat a variety of high-fiber foods  Examples include fruits, vegetables, and whole grains  Ask your healthcare provider how much fiber you need each day  You may need to take a fiber supplement  · Exercise as directed  Exercise, such as walking, may make it easier to have a bowel movement  Ask your healthcare provider to help you create an exercise plan  · Do not have anal sex  Anal sex can weaken the skin around your rectum and anus  · Avoid heavy lifting  This can cause straining and increase your risk for another hemorrhoid  When should I seek immediate care? · You have severe pain in your rectum or around your anus  · You have severe pain in your abdomen and you are vomiting  · You have bleeding from your anus that soaks through your underwear  When should I contact my healthcare provider? · You have frequent and painful bowel movements  · Your hemorrhoid looks or feels more swollen than usual      · You do not have a bowel movement for 2 days or more  · You see or feel tissue coming through your anus  · You have questions or concerns about your condition or care  CARE AGREEMENT:   You have the right to help plan your care   Learn about your health condition and how it may be treated  Discuss treatment options with your healthcare providers to decide what care you want to receive  You always have the right to refuse treatment  The above information is an  only  It is not intended as medical advice for individual conditions or treatments  Talk to your doctor, nurse or pharmacist before following any medical regimen to see if it is safe and effective for you  © Copyright [employer] 2021 Information is for End User's use only and may not be sold, redistributed or otherwise used for commercial purposes   All illustrations and images included in CareNotes® are the copyrighted property of [employer]  or [address] [place] [address] [facility]

## 2022-02-24 ENCOUNTER — OFFICE VISIT (OUTPATIENT)
Dept: GASTROENTEROLOGY | Facility: CLINIC | Age: 32
End: 2022-02-24
Payer: COMMERCIAL

## 2022-02-24 VITALS
SYSTOLIC BLOOD PRESSURE: 136 MMHG | HEIGHT: 62 IN | WEIGHT: 136 LBS | BODY MASS INDEX: 25.03 KG/M2 | HEART RATE: 82 BPM | DIASTOLIC BLOOD PRESSURE: 83 MMHG

## 2022-02-24 DIAGNOSIS — K22.89 ESOPHAGEAL PAIN: Primary | ICD-10-CM

## 2022-02-24 DIAGNOSIS — K59.1 FUNCTIONAL DIARRHEA: ICD-10-CM

## 2022-02-24 DIAGNOSIS — K62.5 RECTAL BLEEDING: ICD-10-CM

## 2022-02-24 DIAGNOSIS — K21.00 GASTROESOPHAGEAL REFLUX DISEASE WITH ESOPHAGITIS WITHOUT HEMORRHAGE: ICD-10-CM

## 2022-02-24 DIAGNOSIS — Z80.0 FAMILY HISTORY OF COLON CANCER: ICD-10-CM

## 2022-02-24 PROCEDURE — 99213 OFFICE O/P EST LOW 20 MIN: CPT | Performed by: INTERNAL MEDICINE

## 2022-02-24 RX ORDER — DOXYCYCLINE HYCLATE 20 MG
40 TABLET ORAL DAILY
COMMUNITY
Start: 2022-02-05 | End: 2022-05-03

## 2022-02-24 RX ORDER — BETAMETHASONE VALERATE 1.2 MG/G
AEROSOL, FOAM TOPICAL AS NEEDED
COMMUNITY
Start: 2022-01-20

## 2022-02-24 RX ORDER — DAPSONE 50 MG/G
GEL TOPICAL
COMMUNITY
Start: 2021-11-30 | End: 2022-05-03

## 2022-02-24 RX ORDER — ISOTRETINOIN 10 MG/1
CAPSULE ORAL
COMMUNITY
End: 2022-05-03

## 2022-02-24 RX ORDER — FAMOTIDINE 20 MG/1
20 TABLET, FILM COATED ORAL 2 TIMES DAILY
Qty: 120 TABLET | Refills: 1 | Status: SHIPPED | OUTPATIENT
Start: 2022-02-24

## 2022-02-24 NOTE — PROGRESS NOTES
[facility] - Outpatient Follow-up Note  Andrew Hernandez 32 y o  female MRN: [id number]  Encounter: [id number]          ASSESSMENT AND PLAN:      1  Esophageal pain  Takes nortriptyline 10 mg daily  Discussed potential interactions with Adderall  2  Functional diarrhea  Markedly improved  3  Rectal bleeding  Far and few in between small amounts discussed possible band ligation    4  Family history of colon cancer  Distant relative    5  Gastroesophageal reflux disease with esophagitis without hemorrhage  Occasionally at night it give her Pepcid b i d  as needed she could also use Gaviscon  She will avoid eating within 2-3 hours of going to bed  She will otherwise follow-up in 6 months   - famotidine (PEPCID) 20 mg tablet; Take 1 tablet (20 mg total) by mouth 2 (two) times a day  Dispense: 120 tablet; Refill: 1    ______________________________________________________________________    SUBJECTIVE:  29-year-old white female had history of hypersensitive esophagus was on nortriptyline  She had been taking at along with that a row  We again reached issue possible adverse effects taking both but she has done fine for quite some time  Discussed tapering off 1 of the other  She will use Pepcid as needed for her reflux we also discussed Gaviscon at night as needed  Her rectal bleeding is more lines of scant hematochezia  We reviewed recent upper endoscopy and colonoscopy along with biopsies  We reviewed recent upper endoscopy and colonoscopy along with biopsies  REVIEW OF SYSTEMS IS OTHERWISE NEGATIVE        Historical Information   Past Medical History:   Diagnosis Date    ADD (attention deficit disorder)     Anxiety     Asthma     seasonal, with URI, and cold weather excercise    Colon polyp     Depression     Epigastric pain     occasional nausea    Esophageal pain     occasional    Gastric hypertonus     GERD (gastroesophageal reflux disease) occasional    Lymphadenopathy, inguinal     PONV (postoperative nausea and vomiting)     Raynaud's disease     Rectal bleeding      Past Surgical History:   Procedure Laterality Date    BUNIONECTOMY      COLONOSCOPY      SUPERFICIAL LYMPH NODE BIOPSY / EXCISION Right     Right groin    UPPER GASTROINTESTINAL ENDOSCOPY       Social History   Social History     Substance and Sexual Activity   Alcohol Use Yes    Comment: occasional     Social History     Substance and Sexual Activity   Drug Use Yes    Types: Marijuana    Comment: medical marijuana-flower not oil     Social History     Tobacco Use   Smoking Status Current Some Day Smoker    Types: Cigars   Smokeless Tobacco Never Used   Tobacco Comment    no cigar today     Family History   Problem Relation Age of Onset    Colon polyps Mother     No Known Problems Father     Colon cancer Maternal Grandfather        Meds/Allergies       Current Outpatient Medications:     amphetamine-dextroamphetamine (ADDERALL) 20 mg tablet    Ascorbic Acid (VITAMIN C PO)    betamethasone valerate (LUXIQ) 0 12 % foam    Calcium Carb-Cholecalciferol (CALCIUM+D3 PO)    Dapsone 5 % topical gel    doxycycline (PERIOSTAT) 20 MG tablet    ISOtretinoin (ACCUTANE) 10 MG capsule    lisdexamfetamine (VYVANSE) 30 MG capsule    nortriptyline (PAMELOR) 10 mg capsule    Omega-3 Fatty Acids (SV FISH OIL PO)    Probiotic Product (PROBIOTIC-10 PO)    VITAMIN A PO    famotidine (PEPCID) 20 mg tablet    Allergies   Allergen Reactions    Cefaclor Hives     Hives around mouth           Objective     Blood pressure 136/83, pulse 82, height 5' 2" (1 575 m), weight 61 7 kg (136 lb)  Body mass index is 24 87 kg/m²        PHYSICAL EXAM:      General Appearance:   Alert, cooperative, no distress   HEENT:   Normocephalic, atraumatic, anicteric      Neck:  Supple, symmetrical, trachea midline   Lungs:   Clear to auscultation bilaterally; no rales, rhonchi or wheezing; respirations unlabored    Heart[de-identified]   Regular rate and rhythm; no murmur, rub, or gallop  Abdomen:   Soft, non-tender, non-distended; normal bowel sounds; no masses, no organomegaly    Genitalia:   Deferred    Rectal:   Deferred    Extremities:  No cyanosis, clubbing or edema    Pulses:  2+ and symmetric    Skin:  No jaundice, rashes, or lesions    Lymph nodes:  No palpable cervical lymphadenopathy        Lab Results:   No visits with results within 1 Day(s) from this visit  Latest known visit with results is:   Hospital Outpatient Visit on 02/04/2022   Component Date Value    EXT Preg Test, Ur 02/04/2022 Negative     Control 02/04/2022 Valid          Radiology Results:   EGD    Addendum Date: 2/4/2022 Addendum:   [facility] [address] [address] [phone] DATE OF SERVICE: 2/04/22 PHYSICIAN(S): Marilou Mcdonald MD Proceduralist INDICATION: Esophageal pain POST-OP DIAGNOSIS: See the impression below  PREPROCEDURE: Informed consent was obtained for the procedure, including sedation  Risks of perforation, hemorrhage, adverse drug reaction and aspiration were discussed  The patient was placed in the left lateral decubitus position  Patient was explained about the risks and benefits of the procedure  Risks including but not limited to bleeding, infection, and perforation were explained in detail  Also explained about less than 100% sensitivity with the exam and other alternatives  DETAILS OF PROCEDURE: Patient was taken to the procedure room where a time out was performed to confirm correct patient and correct procedure  The patient underwent monitored anesthesia care, which was administered by an anesthesia professional  The patient's blood pressure, heart rate, level of consciousness, respirations and oxygen were monitored throughout the procedure  The scope was advanced to the second part of the duodenum  Retroflexion was performed in the fundus   The patient experienced no blood loss  The procedure was not difficult  The patient tolerated the procedure well  There were no apparent complications  ANESTHESIA INFORMATION: ASA: II Anesthesia Type: IV Sedation with Anesthesia MEDICATIONS: No administrations occurring from 1027 to 1041 on 02/04/22 FINDINGS: Erythematous mucosa in the antrum; performed cold forceps biopsy Three polyps measuring from 2 mm up to 3 mm; performed complete en bloc removal by cold forceps biopsy Small plaque at 30 cm removed with cold biopsy The remainder of a detailed exam otherwise unremarkable SPECIMENS: ID Type Source Tests Collected by Time Destination 1 : ANTRUM   cold bx   antritis, check for Hpy Tissue Stomach EXT-TISSUE EXAM (CBL,HNL   ) Maximiliano Lott MD 2/4/2022 10:44 AM  2 : GASTRIC BODY   cold bx   polyps x3 Tissue Polyp, Stomach/Small Intestine EXT-TISSUE EXAM (FLAQUITO,HNL   ) Maximiliano Lott MD 2/4/2022 10:44 AM  3 : EG JUNCTION   cold bx   erythema Tissue Esophagus EXT-TISSUE EXAM (FLAQUITO,HNL   ) Maximiliano Lott MD 2/4/2022 10:45 AM  4 : ESOPH AT 30 CM   cold bx   white plaque Tissue Esophagus EXT-TISSUE EXAM (FLAQUITO,HNL   ) Maximiliano Lott MD 2/4/2022 10:45 AM  IMPRESSION: Mild antritis, hypersensitive esophagus, small plaque mid esophagus removed RECOMMENDATION: Await biopsies, reflux precautions, continue with nortriptyline 10 mg, schedule office visit   Maximiliano Lott MD    Result Date: 2/4/2022  Narrative: [facility] [address] [place] [address] [phone] DATE OF SERVICE: 2/04/22 PHYSICIAN(S): Maximiliano Lott MD Proceduralist INDICATION: Esophageal pain POST-OP DIAGNOSIS: See the impression below  PREPROCEDURE: Informed consent was obtained for the procedure, including sedation  Risks of perforation, hemorrhage, adverse drug reaction and aspiration were discussed  The patient was placed in the left lateral decubitus position  Patient was explained about the risks and benefits of the procedure   Risks including but not limited to bleeding, infection, and perforation were explained in detail  Also explained about less than 100% sensitivity with the exam and other alternatives  DETAILS OF PROCEDURE: Patient was taken to the procedure room where a time out was performed to confirm correct patient and correct procedure  The patient underwent monitored anesthesia care, which was administered by an anesthesia professional  The patient's blood pressure, heart rate, level of consciousness, respirations and oxygen were monitored throughout the procedure  The scope was advanced to the second part of the duodenum  Retroflexion was performed in the fundus  The patient experienced no blood loss  The procedure was not difficult  The patient tolerated the procedure well  There were no apparent complications  ANESTHESIA INFORMATION: ASA: II Anesthesia Type: IV Sedation with Anesthesia MEDICATIONS: No administrations occurring from 1027 to 1041 on 02/04/22 FINDINGS: Erythematous mucosa in the antrum; performed cold forceps biopsy Three polyps measuring from 2 mm up to 3 mm; performed complete en bloc removal by cold forceps biopsy Small plaque at 30 cm removed with cold biopsy The remainder of a detailed exam otherwise unremarkable SPECIMENS: * No specimens in log *     Impression: Mild antritis, hypersensitive esophagus, small plaque mid esophagus removed RECOMMENDATION: Await biopsies, reflux precautions, continue with nortriptyline 10 mg, schedule office visit   Cliff Tiwari MD     Colonoscopy    Result Date: 2/4/2022  Narrative: [facility] [address] [facility] [phone] DATE OF SERVICE: 2/04/22 PHYSICIAN(S): Cliff Tiwari MD Proceduralist INDICATION: Rectal bleeding Colonoscopy performed for a diagnostic indication  POST-OP DIAGNOSIS: See the impression below   HISTORY: Prior colonoscopy:  Years ago BOWEL PREPARATION: Miralax/Dulcolax PREPROCEDURE: Informed consent was obtained for the procedure, including sedation  Risks including but not limited to bleeding, infection, perforation, adverse drug reaction and aspiration were explained in detail  Also explained about less than 100% sensitivity with the exam and other alternatives  The patient was placed in the left lateral decubitus position  DETAILS OF PROCEDURE: Patient was taken to the procedure room where a time out was performed to confirm correct patient and correct procedure  The patient underwent monitored anesthesia care, which was administered by an anesthesia professional  The patient's blood pressure, heart rate, level of consciousness, oxygen and respirations were monitored throughout the procedure  A digital rectal exam was performed  The scope was introduced through the anus and advanced to the terminal ileum  Retroflexion was performed in the rectum  The quality of bowel preparation was evaluated using the [facility] Bowel Preparation Scale with scores of: right colon = 2, transverse colon = 2, left colon = 2  The total BBPS score was 6  Bowel prep was adequate  The patient experienced no blood loss  The procedure was not difficult  The patient tolerated the procedure well  There were no apparent complications  ANESTHESIA INFORMATION: ASA: II Anesthesia Type: IV Sedation with Anesthesia MEDICATIONS: No administrations occurring from 1027 to 1107 on 02/04/22 FINDINGS: Rectal exam no masses Small, flat, internal hemorrhoids Performed biopsies in the ascending colon and sigmoid colon The remainder of a detailed exam was within normal limits including the appendix opening and distal terminal ileum, much washing and suctioning approximately 700 cc suctioned EVENTS: Procedure Events Event Event Time ENDO SCOPE OUT TIME 2/4/2022 10:41 AM ENDO CECUM REACHED 2/4/2022 10:53 AM ENDO SCOPE OUT TIME 2/4/2022 11:07 AM SPECIMENS: ID Type Source Tests Collected by Time Destination 1 : ANTRUM   cold bx   antritis, check for Hpy Tissue Stomach EXT-TISSUE EXAM (CBL,HNL   ) Antwan Finnegan MD 2/4/2022 10:44 AM  2 : GASTRIC BODY   cold bx   polyps x3 Tissue Polyp, Stomach/Small Intestine EXT-TISSUE EXAM (FLAQUITO,HNL   ) Antwan Finnegan MD 2/4/2022 10:44 AM  3 : EG JUNCTION   cold bx   erythema Tissue Esophagus EXT-TISSUE EXAM (FLAQUITO,HNL   ) Antwan Finnegan MD 2/4/2022 10:45 AM  4 : ESOPH AT 30 CM   cold bx   white plaque Tissue Esophagus EXT-TISSUE EXAM (FLAQUITO,HNL   ) Antwan Finnegan MD 2/4/2022 10:45 AM  5 : ASCENDING(RANDOM)   cold bx   diarrhea,check for colitis Tissue Large Intestine, Right/Ascending Colon EXT-TISSUE EXAM (FLAQUITO,HNL   ) Antwan Finnegan MD 2/4/2022 10:59 AM  6 : SIGMOID(RANDOM)   cold bx   diarrhea, check for colitis Tissue Large Intestine, Sigmoid Colon EXT-TISSUE EXAM (FLAQUITO,HNL   ) Antwan Finnegan MD 2/4/2022 11:06 AM  EQUIPMENT: Colonoscope -[id number]     Impression: Normal colonoscopy with small flat hemorrhoids RECOMMENDATION: Await biopsies, observe  Antwan Finnegan MD

## 2022-03-22 ENCOUNTER — OFFICE VISIT (OUTPATIENT)
Dept: PHYSICAL THERAPY | Facility: REHABILITATION | Age: 32
End: 2022-03-22
Payer: COMMERCIAL

## 2022-03-22 DIAGNOSIS — N81.89 PELVIC FLOOR WEAKNESS: ICD-10-CM

## 2022-03-22 DIAGNOSIS — N39.46 MIXED STRESS AND URGE URINARY INCONTINENCE: Primary | ICD-10-CM

## 2022-03-22 DIAGNOSIS — M62.89 PELVIC FLOOR TENSION: ICD-10-CM

## 2022-03-22 PROCEDURE — 97530 THERAPEUTIC ACTIVITIES: CPT | Performed by: PHYSICAL THERAPIST

## 2022-03-22 PROCEDURE — 97162 PT EVAL MOD COMPLEX 30 MIN: CPT | Performed by: PHYSICAL THERAPIST

## 2022-03-22 NOTE — PROGRESS NOTES
PT Evaluation     Today's date: 3/22/2022  Patient name: Candace Obrien  : 1990  MRN: [id number]  Referring provider: Francy Carrion  Dx:   Encounter Diagnosis     ICD-10-CM    1  Mixed stress and urge urinary incontinence  N39 46    2  Pelvic floor tension  M62 89    3  Pelvic floor weakness  N81 89                   Assessment  Assessment details: Candace Obrien is a 32 y o  female who presents with concerns of mixed urinary incontinence  Examination today reveals increased muscle tone with concurrent decrease in pelvic floor muscle active contraction as well as decreased power along periurethral tissue  Her obturator internus is also markedly tight on her left side  Patient presents with the below outlined deficits and is appropriate for skilled physical therapy in order to address deficits and ultimately meet goal of independent self management of condition  Therapeutic activities performed upon examination included education regarding pelvic floor anatomy, explanation of exam technique, explanation of exam findings and discussion of treatment plan as well as expectations of the patient to emphasize the importance of compliance and adherence to physical therapy visits  Impairments: abnormal muscle tone, abnormal or restricted ROM, activity intolerance, impaired physical strength and lacks appropriate home exercise program  Understanding of Dx/Px/POC: good   Prognosis: good    Goals  STGs to be met in 4 weeks:  * Patient will be compliant with introductory HEP as prescribed  * Patient presents with good understanding of pelvic floor protective strategies to reduce intra-abdominal pressure against pelvic organs  LTGs to be met by discharge:  * Patient will present with a  60% improvement on FOTO score by discharge to indicate improved symptom management  * Normalize sEMG findings to indicate strength average > 12uV and resting average < 2 0uV     * Demonstrates correct isolation and relaxation of pelvic floor to palpation without overflow from global stabilizers  * Reports that she/he can cough/laugh/sneeze with at least 90% less bladder leakage  * Presents with improved nocturia to 0 toiletings/night for more thorough sleep  * Patient will use pelvic floor muscles correctly during functional ADLs such as coughing, sneezing, lifting and exercise activities to avoid excessive IAP and PFM strain  * Patient will be compliant with comprehensive home exercise program for self management of condition  '    Plan  Patient would benefit from: skilled physical therapy  Referral necessary: No  Planned therapy interventions: activity modification, neuromuscular re-education, patient education, strengthening, therapeutic activities, therapeutic exercise and home exercise program  Frequency: 1x week  Duration in weeks: 12  Plan of Care beginning date: 3/22/2022  Plan of Care expiration date: 6/14/2022  Treatment plan discussed with: patient        PT Pelvic Floor Subjective:   History of Present Illness:   Patient report that she has been having PFD since 2017  She first addressed it in 2019  She underwent urodynamics due to UUI and urinary urgency in 2019 and was found to be fine  She received PF[facility] x 2 [facility] with MT and she notes that she felt at though she hit a plateau       She lost her brother 1 year ago and she notes that the past year has been "terrible" related to stress levels    C/C: UUI while walking to the [place], the past 2 weeks with flatulence and coughing    Bladder incontinence is occurring daily whether stress or urge    From August - December 2021, she received PFPT at [facility]     lives in [place] and she lives in [place] and attends [employer] (doctorate in athletic training)        Recurrent probem    Quality of life: good    Social Support:     Relationship status: /committed    Work status: employed part time ( [employer])    Life stress level: 9  Hand dominance:  Right  Diet and Exercise:    Diet:balanced nutrition    Pilates and power yoga - but not currently due to busy schedule  Co-morbidities:    Lymphendectomy on R side - 2 nodes removed due to swelling in groin  R hip labral tear - + MRI, "gives way," pinches especially when her SIJ is problematic  OB/ gyn History    Gestational History:     Prior Pregnancy: No      Menstrual History:    Date of last menstrual period: 3/13/2022    Menstrual irregularities regular menses    Painful periods:  No difficulty managing menstrual pain    Tolerates tampons: just switched     Menopausal: no menopause    Birth control method: IUD (paraguard)  Uses menstrual disc for period - notices watery discharge with blood during cycle x 2 months   Bladder Function:     Voiding Difficulties positive for: incomplete emptying      Voiding Difficulties comments:     Voiding frequency: every 1-2 hours    Urinary leakage aggravated by: coughing, sneezing, walking to the bathroom, key-in-the-door syndrome and anxiety    Nocturia (episodes per night): 1 and 0    Intake (ounces): Water intake (oz): 64 ounces  Juice intake (oz): occ  Coffee intake (oz): 1- 3 cups  Tea intake (oz): hot tea intermitten   Alcohol intake (oz): social     Intake (ounces) comment: Squats over public toilet seats  Feels dehydrated throughout the day  Bowel Function:     Voiding DIfficulties: urgency      Bowel Function comments:  IBS-C&D -see GI due to h/o colon cancer in family  Takes fiber supplement   Recent colonoscopy and endoscopy -wnl per patient report    Bowel frequency: daily    [place]is Stool Scale: type 5 and type 4  Sexual Function:     Sexually Active:  Non-contributory  Pain:     Current pain ratin    At best pain ratin    At worst pain ratin    Location:  Vaginal during insertion  Treatments:     Previous treatment:  Physical therapyno  Patient Goals:     Patient goals for therapy:  Improved quality of life, fully empty bladder or bowels and improved bladder or bowel function      Objective   Pelvic Floor Exam     General Perineum Exam:   Positive for swelling  General perineum exam comments: No discharge, irritation, organ prolapse or skin breakdown evident      Visual Inspection of Perineum:   Excursion of perineal body in cephalad direction with contraction of pelvic floor muscles (PFM): fair   Excursion of perineal body in caudal direction with relaxation of pelvic floor muscles (PFM): fair   Involuntary contraction with coughing: yes  Involuntary relaxation with bearing down: yes  Cotton swab test: non-tender  Cough reflex: cough reflex  Sphincter Tone Resting: normal  Sphincter Tone Squeeze: normal  Sensation: intact    Pelvic Organ Prolapse   no pelvic organ prolapse    Pelvic Floor Muscle Exam     Palpation   Minimal increased muscle tension in the super transverse perineal, coccygeus and obturator internus Comment: ~75% decreased active muscle engagement  No tenderness on right in the super transverse perineal  Minimal tenderness on right in the coccygeus and obturator internus Comment: ~ 25% decreased active muscle contraction  Minimal tenderness on left in the super transverse perineal, coccygeus and obturator internus  Moderate tenderness on left in the coccygeus and obturator internus    Muscle Contraction: well isolated  Breathing pattern with contraction: within normal limits  Pelvic floor muscle relaxation is incomplete     25% pelvic floor relaxation    PERFECT Score   Power right: 3/5  Power left: 3/5  Endurance (seconds to max): 6  Repetitions (before fatigue): 6    SMEG Biofeedback   to be assessed next treatment               Precautions: standard      Manuals 3/22            Gentle downtraining nv                                                   Neuro Re-Ed             Breath ed + PFM (she's had previous PT) Ther Ex                          Reformer:                                                                                            Ther Activity             education anatomy and POC x10'            Urge suppression             Gait Training                                       Modalities

## 2022-03-22 NOTE — LETTER
2022    [address] [place] [address]    Patient: Yoselyn Benjamin   YOB: 1990   Date of Visit: 3/22/2022     Encounter Diagnosis     ICD-10-CM    1  Mixed stress and urge urinary incontinence  N39 46    2  Pelvic floor tension  M62 89    3  Pelvic floor weakness  N81 89        Dear Dr Post Fus:    Yoselyn Benjamin is a 32 y o  female was self referred and evaluated for physical therapy on 2022 under Direct Access  After assessment, Yoselyn Benjamin was determined to have the above listed impairments and will benefit from skilled PT to improve deficits to return to prior level of function  Under direct access, the patient can be treated for 30 days without a prescription from the physician  If you agree to the patient's plan of care, please sign and return  Please do not hesitate to contact me at [phone]  Thank you for allowing me to participate in Yoselyn Benjamin care  Please verify that you agree with the plan of care by signing the attached order  If you have any questions or concerns, please do not hesitate to call  I sincerely appreciate the opportunity to share in the care of one of your patients and hope to have another opportunity to work with you in the near future  Sincerely,    Marquis Camarena, PT      Referring Provider:      I certify that I have read the below Plan of Care and certify the need for these services furnished under this plan of treatment while under my care  [address] [place] [id number]  Via Fax: [phone]          PT Evaluation     Today's date: 3/22/2022  Patient name: Yoselyn Benjamin  : 1990  MRN: [id number]  Referring provider: Konstantin Krishnan  Dx:   Encounter Diagnosis     ICD-10-CM    1  Mixed stress and urge urinary incontinence  N39 46    2  Pelvic floor tension  M62 89    3   Pelvic floor weakness N81 89                   Assessment  Assessment details: Marin Andrade is a 32 y o  female who presents with concerns of mixed urinary incontinence  Examination today reveals increased muscle tone with concurrent decrease in pelvic floor muscle active contraction as well as decreased power along periurethral tissue  Her obturator internus is also markedly tight on her left side  Patient presents with the below outlined deficits and is appropriate for skilled physical therapy in order to address deficits and ultimately meet goal of independent self management of condition  Therapeutic activities performed upon examination included education regarding pelvic floor anatomy, explanation of exam technique, explanation of exam findings and discussion of treatment plan as well as expectations of the patient to emphasize the importance of compliance and adherence to physical therapy visits  Impairments: abnormal muscle tone, abnormal or restricted ROM, activity intolerance, impaired physical strength and lacks appropriate home exercise program  Understanding of Dx/Px/POC: good   Prognosis: good    Goals  STGs to be met in 4 weeks:  * Patient will be compliant with introductory HEP as prescribed  * Patient presents with good understanding of pelvic floor protective strategies to reduce intra-abdominal pressure against pelvic organs  LTGs to be met by discharge:  * Patient will present with a  60% improvement on FOTO score by discharge to indicate improved symptom management  * Normalize sEMG findings to indicate strength average > 12uV and resting average < 2 0uV  * Demonstrates correct isolation and relaxation of pelvic floor to palpation without overflow from global stabilizers  * Reports that she/he can cough/laugh/sneeze with at least 90% less bladder leakage  * Presents with improved nocturia to 0 toiletings/night for more thorough sleep     * Patient will use pelvic floor muscles correctly during functional ADLs such as coughing, sneezing, lifting and exercise activities to avoid excessive IAP and PFM strain  * Patient will be compliant with comprehensive home exercise program for self management of condition  '    Plan  Patient would benefit from: skilled physical therapy  Referral necessary: No  Planned therapy interventions: activity modification, neuromuscular re-education, patient education, strengthening, therapeutic activities, therapeutic exercise and home exercise program  Frequency: 1x week  Duration in weeks: 12  Plan of Care beginning date: 3/22/2022  Plan of Care expiration date: 6/14/2022  Treatment plan discussed with: patient        PT Pelvic Floor Subjective:   History of Present Illness:   Patient report that she has been having PFD since 2017  She first addressed it in 2019  She underwent urodynamics due to UUI and urinary urgency in 2019 and was found to be fine  She received [facility] x 2 [facility] at [facility] with MT and she notes that she felt at though she hit a plateau       She lost her brother 1 year ago and she notes that the past year has been "terrible" related to stress levels    C/C: UUI [place], the past 2 weeks with flatulence and coughing    Bladder incontinence is occurring daily whether stress or urge    From August - December 2021, she received PFPT at [facility]     lives in [facility] and she lives in [place] and 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)        Recurrent probem    Quality of life: good    Social Support:     Relationship status: /committed    Work status: employed part time ([address])    Life stress level: 9  Hand dominance:  Right  Diet and Exercise:    Diet:balanced nutrition    Pilates and power yoga - but not currently due to busy schedule  Co-morbidities:    Lymphendectomy on R side - 2 nodes removed due to swelling in groin  R hip labral tear - + MRI, "gives way," pinches especially when her SIJ is problematic  OB/ gyn History    Gestational History:     Prior Pregnancy: No      Menstrual History:    Date of last menstrual period: 3/13/2022    Menstrual irregularities regular menses    Painful periods:  No difficulty managing menstrual pain    Tolerates tampons: just switched     Menopausal: no menopause    Birth control method: IUD (paraguard)  Uses menstrual disc for period - notices watery discharge with blood during cycle x 2 months   Bladder Function:     Voiding Difficulties positive for: incomplete emptying      Voiding Difficulties comments:     Voiding frequency: every 1-2 hours    Urinary leakage aggravated by: coughing, sneezing, walking to the bathroom, key-in-the-door syndrome and anxiety    Nocturia (episodes per night): 1 and 0    Intake (ounces): Water intake (oz): 64 ounces  Juice intake (oz): occ  Coffee intake (oz): 1- 3 cups  Tea intake (oz): hot tea intermitten  Alcohol intake (oz): social     Intake (ounces) comment: Squats over public toilet seats  Feels dehydrated throughout the day  Bowel Function:     Voiding DIfficulties: urgency      Bowel Function comments:  IBS-C&D -see GI due to h/o colon cancer in family  Takes fiber supplement   Recent colonoscopy and endoscopy -wnl per patient report    Bowel frequency: daily    [place] Stool Scale: type 5 and type 4  Sexual Function:     Sexually Active:  Non-contributory  Pain:     Current pain ratin    At best pain ratin    At worst pain ratin    Location:  Vaginal during insertion  Treatments:     Previous treatment:  Physical therapyno  Patient Goals:     Patient goals for therapy:  Improved quality of life, fully empty bladder or bowels and improved bladder or bowel function      Objective   Pelvic Floor Exam     General Perineum Exam:   Positive for swelling       General perineum exam comments: No discharge, irritation, organ prolapse or skin breakdown evident      Visual Inspection of Perineum:   Excursion of perineal body in cephalad direction with contraction of pelvic floor muscles (PFM): fair   Excursion of perineal body in caudal direction with relaxation of pelvic floor muscles (PFM): fair   Involuntary contraction with coughing: yes  Involuntary relaxation with bearing down: yes  Cotton swab test: non-tender  Cough reflex: cough reflex  Sphincter Tone Resting: normal  Sphincter Tone Squeeze: normal  Sensation: intact    Pelvic Organ Prolapse   no pelvic organ prolapse    Pelvic Floor Muscle Exam     Palpation   Minimal increased muscle tension in the super transverse perineal, coccygeus and obturator internus Comment: ~75% decreased active muscle engagement  No tenderness on right in the super transverse perineal  Minimal tenderness on right in the coccygeus and obturator internus Comment: ~ 25% decreased active muscle contraction  Minimal tenderness on left in the super transverse perineal, coccygeus and obturator internus  Moderate tenderness on left in the coccygeus and obturator internus    Muscle Contraction: well isolated  Breathing pattern with contraction: within normal limits  Pelvic floor muscle relaxation is incomplete  25% pelvic floor relaxation    PERFECT Score   Power right: 3/5  Power left: 3/5  Endurance (seconds to max): 6  Repetitions (before fatigue): 6    SMEG Biofeedback   to be assessed next treatment               Precautions: standard      Manuals 3/22            Gentle downtraining nv                                                   Neuro Re-Ed             Breath ed + PFM (she's had previous PT)                                                                                           Ther Ex                          Reformer:                                                                                            Ther Activity             education anatomy and POC x10'            Urge suppression             Gait Training Modalities

## 2022-03-22 NOTE — LETTER
2022    [address] [place] [address]    Patient: Isabela Hinojosa   YOB: 1990   Date of Visit: 3/22/2022     Encounter Diagnosis     ICD-10-CM    1  Mixed stress and urge urinary incontinence  N39 46    2  Pelvic floor tension  M62 89    3  Pelvic floor weakness  N81 89        Dear Dr Salma Gayle:    Thank you for your recent referral of Isabela Hinojosa  Please review the attached evaluation summary from Sis's recent visit  Please verify that you agree with the plan of care by signing the attached order  If you have any questions or concerns, please do not hesitate to call  I sincerely appreciate the opportunity to share in the care of one of your patients and hope to have another opportunity to work with you in the near future  Sincerely,    Jana Peter, PT      Referring Provider:      I certify that I have read the below Plan of Care and certify the need for these services furnished under this plan of treatment while under my care  [address] [place] [id number]  Via Fax: [phone]          PT Evaluation     Today's date: 3/22/2022  Patient name: Isabela Hinojosa  : 1990  MRN: [id number]  Referring provider: Linda Garcia  Dx:   Encounter Diagnosis     ICD-10-CM    1  Mixed stress and urge urinary incontinence  N39 46    2  Pelvic floor tension  M62 89    3  Pelvic floor weakness  N81 89                   Assessment  Assessment details: Isabela Hinojosa is a 32 y o  female who presents with concerns of mixed urinary incontinence  Examination today reveals increased muscle tone with concurrent decrease in pelvic floor muscle active contraction as well as decreased power along periurethral tissue  Her obturator internus is also markedly tight on her left side    Patient presents with the below outlined deficits and is appropriate for skilled physical therapy in order to address deficits and ultimately meet goal of independent self management of condition  Therapeutic activities performed upon examination included education regarding pelvic floor anatomy, explanation of exam technique, explanation of exam findings and discussion of treatment plan as well as expectations of the patient to emphasize the importance of compliance and adherence to physical therapy visits  Impairments: abnormal muscle tone, abnormal or restricted ROM, activity intolerance, impaired physical strength and lacks appropriate home exercise program  Understanding of Dx/Px/POC: good   Prognosis: good    Goals  STGs to be met in 4 weeks:  * Patient will be compliant with introductory HEP as prescribed  * Patient presents with good understanding of pelvic floor protective strategies to reduce intra-abdominal pressure against pelvic organs  LTGs to be met by discharge:  * Patient will present with a  60% improvement on FOTO score by discharge to indicate improved symptom management  * Normalize sEMG findings to indicate strength average > 12uV and resting average < 2 0uV  * Demonstrates correct isolation and relaxation of pelvic floor to palpation without overflow from global stabilizers  * Reports that she/he can cough/laugh/sneeze with at least 90% less bladder leakage  * Presents with improved nocturia to 0 toiletings/night for more thorough sleep  * Patient will use pelvic floor muscles correctly during functional ADLs such as coughing, sneezing, lifting and exercise activities to avoid excessive IAP and PFM strain     * Patient will be compliant with comprehensive home exercise program for self management of condition  '    Plan  Patient would benefit from: skilled physical therapy  Referral necessary: No  Planned therapy interventions: activity modification, neuromuscular re-education, patient education, strengthening, therapeutic activities, therapeutic exercise and home exercise program  Frequency: 1x week  Duration in weeks: 12  Plan of Care beginning date: 3/22/2022  Plan of Care expiration date: 6/14/2022  Treatment plan discussed with: patient        PT Pelvic Floor Subjective:   History of Present Illness:   Patient report that she has been having PFD since 2017  She first addressed it in 2019  She underwent urodynamics due to UUI and urinary urgency in 2019 and was found to be fine  She received PFPT x 2 months at [facility] with MT and she notes that she felt at though she hit a plateau       She lost her brother 1 year ago and she notes that the past year has been "terrible" related to stress levels    C/C: [place], the past 2 weeks with flatulence and coughing    Bladder incontinence is occurring daily whether stress or urge    From August - December 2021, she received PFPT at [facility]     lives in [place] and she lives in [place] and 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)        Recurrent probem    Quality of life: good    Social Support:     Relationship status: /committed    Work status: employed part time ([address])    Life stress level: 9  Hand dominance:  Right  Diet and Exercise:    Diet:balanced nutrition    Pilates and power yoga - but not currently due to busy schedule  Co-morbidities:    Lymphendectomy on R side - 2 nodes removed due to swelling in groin  R hip labral tear - + MRI, "gives way," pinches especially when her SIJ is problematic  OB/ gyn History    Gestational History:     Prior Pregnancy: No      Menstrual History:    Date of last menstrual period: 3/13/2022    Menstrual irregularities regular menses    Painful periods:  No difficulty managing menstrual pain    Tolerates tampons: just switched     Menopausal: no menopause    Birth control method: IUD (paraguard)  Uses menstrual disc for period - notices watery discharge with blood during cycle x 2 months Bladder Function:     Voiding Difficulties positive for: incomplete emptying      Voiding Difficulties comments:     Voiding frequency: every 1-2 hours    Urinary leakage aggravated by: coughing, sneezing, walking to the bathroom, key-in-the-door syndrome and anxiety    Nocturia (episodes per night): 1 and 0    Intake (ounces): Water intake (oz): 64 ounces  Juice intake (oz): occ  Coffee intake (oz): 1- 3 cups  Tea intake (oz): hot tea intermitten  Alcohol intake (oz): social     Intake (ounces) comment: Squats over public toilet seats  Feels dehydrated throughout the day  Bowel Function:     Voiding DIfficulties: urgency      Bowel Function comments:  IBS-C&D -see GI due to h/o colon cancer in family  Takes fiber supplement   Recent colonoscopy and endoscopy -wnl per patient report    Bowel frequency: daily    [place] Stool Scale: type 5 and type 4  Sexual Function:     Sexually Active:  Non-contributory  Pain:     Current pain ratin    At best pain ratin    At worst pain ratin    Location:  Vaginal during insertion  Treatments:     Previous treatment:  Physical therapyno  Patient Goals:     Patient goals for therapy:  Improved quality of life, fully empty bladder or bowels and improved bladder or bowel function      Objective   Pelvic Floor Exam     General Perineum Exam:   Positive for swelling       General perineum exam comments: No discharge, irritation, organ prolapse or skin breakdown evident      Visual Inspection of Perineum:   Excursion of perineal body in cephalad direction with contraction of pelvic floor muscles (PFM): fair   Excursion of perineal body in caudal direction with relaxation of pelvic floor muscles (PFM): fair   Involuntary contraction with coughing: yes  Involuntary relaxation with bearing down: yes  Cotton swab test: non-tender  Cough reflex: cough reflex  Sphincter Tone Resting: normal  Sphincter Tone Squeeze: normal  Sensation: intact    Pelvic Organ Prolapse   no pelvic organ prolapse    Pelvic Floor Muscle Exam     Palpation   Minimal increased muscle tension in the super transverse perineal, coccygeus and obturator internus Comment: ~75% decreased active muscle engagement  No tenderness on right in the super transverse perineal  Minimal tenderness on right in the coccygeus and obturator internus Comment: ~ 25% decreased active muscle contraction  Minimal tenderness on left in the super transverse perineal, coccygeus and obturator internus  Moderate tenderness on left in the coccygeus and obturator internus    Muscle Contraction: well isolated  Breathing pattern with contraction: within normal limits  Pelvic floor muscle relaxation is incomplete  25% pelvic floor relaxation    PERFECT Score   Power right: 3/5  Power left: 3/5  Endurance (seconds to max): 6  Repetitions (before fatigue): 6    SMEG Biofeedback   to be assessed next treatment               Precautions: standard      Manuals 3/22            Gentle downtraining nv                                                   Neuro Re-Ed             Breath ed + PFM (she's had previous PT)                                                                                           Ther Ex                          Reformer:                                                                                            Ther Activity             education anatomy and POC x10'            Urge suppression             Gait Training                                       Modalities

## 2022-03-28 ENCOUNTER — APPOINTMENT (OUTPATIENT)
Dept: LAB | Facility: HOSPITAL | Age: 32
End: 2022-03-28
Payer: COMMERCIAL

## 2022-03-28 ENCOUNTER — HOSPITAL ENCOUNTER (OUTPATIENT)
Dept: ULTRASOUND IMAGING | Facility: HOSPITAL | Age: 32
Discharge: HOME | End: 2022-03-28
Payer: COMMERCIAL

## 2022-03-28 DIAGNOSIS — E55.9 VITAMIN D DEFICIENCY, UNSPECIFIED: ICD-10-CM

## 2022-03-28 DIAGNOSIS — M79.10 MUSCLE PAIN: ICD-10-CM

## 2022-03-28 DIAGNOSIS — E55.9 VITAMIN D DEFICIENCY DISEASE: ICD-10-CM

## 2022-03-28 DIAGNOSIS — E01.0 IODINE-DEFICIENCY RELATED DIFFUSE (ENDEMIC) GOITER: ICD-10-CM

## 2022-03-28 DIAGNOSIS — E01.0 IODINE-DEFICIENCY RELATED DIFFUSE GOITER: ICD-10-CM

## 2022-03-28 DIAGNOSIS — R53.83 FATIGUE, UNSPECIFIED TYPE: ICD-10-CM

## 2022-03-28 LAB
25(OH)D3 SERPL-MCNC: 23.2 NG/ML (ref 30–100)
ALBUMIN SERPL BCP-MCNC: 4.1 G/DL (ref 3.5–5)
ALP SERPL-CCNC: 64 U/L (ref 46–116)
ALT SERPL W P-5'-P-CCNC: 25 U/L (ref 12–78)
ANION GAP SERPL CALCULATED.3IONS-SCNC: 5 MMOL/L (ref 4–13)
AST SERPL W P-5'-P-CCNC: 17 U/L (ref 5–45)
BASOPHILS # BLD AUTO: 0.05 THOUSANDS/ΜL (ref 0–0.1)
BASOPHILS NFR BLD AUTO: 1 % (ref 0–1)
BILIRUB DIRECT SERPL-MCNC: 0.35 MG/DL (ref 0–0.2)
BILIRUB SERPL-MCNC: 1.66 MG/DL (ref 0.2–1)
BUN SERPL-MCNC: 6 MG/DL (ref 5–25)
CALCIUM SERPL-MCNC: 8.9 MG/DL (ref 8.3–10.1)
CHLORIDE SERPL-SCNC: 104 MMOL/L (ref 100–108)
CO2 SERPL-SCNC: 30 MMOL/L (ref 21–32)
CREAT SERPL-MCNC: 0.75 MG/DL (ref 0.6–1.3)
EOSINOPHIL # BLD AUTO: 0.13 THOUSAND/ΜL (ref 0–0.61)
EOSINOPHIL NFR BLD AUTO: 2 % (ref 0–6)
ERYTHROCYTE [DISTWIDTH] IN BLOOD BY AUTOMATED COUNT: 12.6 % (ref 11.6–15.1)
ERYTHROCYTE [SEDIMENTATION RATE] IN BLOOD: <1 MM/HOUR (ref 0–19)
GFR SERPL CREATININE-BSD FRML MDRD: 106 ML/MIN/1.73SQ M
GLUCOSE P FAST SERPL-MCNC: 86 MG/DL (ref 65–99)
HCT VFR BLD AUTO: 40.6 % (ref 34.8–46.1)
HGB BLD-MCNC: 13.8 G/DL (ref 11.5–15.4)
IMM GRANULOCYTES # BLD AUTO: 0.01 THOUSAND/UL (ref 0–0.2)
IMM GRANULOCYTES NFR BLD AUTO: 0 % (ref 0–2)
LYMPHOCYTES # BLD AUTO: 1.68 THOUSANDS/ΜL (ref 0.6–4.47)
LYMPHOCYTES NFR BLD AUTO: 32 % (ref 14–44)
MCH RBC QN AUTO: 29.7 PG (ref 26.8–34.3)
MCHC RBC AUTO-ENTMCNC: 34 G/DL (ref 31.4–37.4)
MCV RBC AUTO: 88 FL (ref 82–98)
MONOCYTES # BLD AUTO: 0.39 THOUSAND/ΜL (ref 0.17–1.22)
MONOCYTES NFR BLD AUTO: 7 % (ref 4–12)
NEUTROPHILS # BLD AUTO: 3.06 THOUSANDS/ΜL (ref 1.85–7.62)
NEUTS SEG NFR BLD AUTO: 58 % (ref 43–75)
NRBC BLD AUTO-RTO: 0 /100 WBCS
PLATELET # BLD AUTO: 288 THOUSANDS/UL (ref 149–390)
PMV BLD AUTO: 8.6 FL (ref 8.9–12.7)
POTASSIUM SERPL-SCNC: 3.9 MMOL/L (ref 3.5–5.3)
PROT SERPL-MCNC: 7.8 G/DL (ref 6.4–8.2)
RBC # BLD AUTO: 4.64 MILLION/UL (ref 3.81–5.12)
SODIUM SERPL-SCNC: 139 MMOL/L (ref 136–145)
TSH SERPL DL<=0.05 MIU/L-ACNC: 0.49 UIU/ML (ref 0.36–3.74)
WBC # BLD AUTO: 5.32 THOUSAND/UL (ref 4.31–10.16)

## 2022-03-28 PROCEDURE — 80053 COMPREHEN METABOLIC PANEL: CPT

## 2022-03-28 PROCEDURE — 85025 COMPLETE CBC W/AUTO DIFF WBC: CPT

## 2022-03-28 PROCEDURE — 82306 VITAMIN D 25 HYDROXY: CPT

## 2022-03-28 PROCEDURE — 76536 US EXAM OF HEAD AND NECK: CPT

## 2022-03-28 PROCEDURE — 86618 LYME DISEASE ANTIBODY: CPT

## 2022-03-28 PROCEDURE — 82248 BILIRUBIN DIRECT: CPT

## 2022-03-28 PROCEDURE — 36415 COLL VENOUS BLD VENIPUNCTURE: CPT

## 2022-03-28 PROCEDURE — 84443 ASSAY THYROID STIM HORMONE: CPT

## 2022-03-28 PROCEDURE — 85652 RBC SED RATE AUTOMATED: CPT

## 2022-03-29 LAB — B BURGDOR IGG+IGM SER-ACNC: 32

## 2022-03-31 ENCOUNTER — OFFICE VISIT (OUTPATIENT)
Dept: PHYSICAL THERAPY | Facility: REHABILITATION | Age: 32
End: 2022-03-31
Payer: COMMERCIAL

## 2022-03-31 DIAGNOSIS — M62.89 PELVIC FLOOR TENSION: ICD-10-CM

## 2022-03-31 DIAGNOSIS — N39.46 MIXED STRESS AND URGE URINARY INCONTINENCE: Primary | ICD-10-CM

## 2022-03-31 DIAGNOSIS — N81.89 PELVIC FLOOR WEAKNESS: ICD-10-CM

## 2022-03-31 PROCEDURE — 90912 BFB TRAINING 1ST 15 MIN: CPT | Performed by: PHYSICAL THERAPIST

## 2022-03-31 PROCEDURE — 97530 THERAPEUTIC ACTIVITIES: CPT | Performed by: PHYSICAL THERAPIST

## 2022-03-31 PROCEDURE — 90913 BFB TRAINING EA ADDL 15 MIN: CPT | Performed by: PHYSICAL THERAPIST

## 2022-03-31 NOTE — PROGRESS NOTES
Daily Note     Today's date: 3/31/2022  Patient name: Tristen Sánchez  : 1990  MRN: [id number]  Referring provider: Camelia Lee  Dx:   Encounter Diagnosis     ICD-10-CM    1  Mixed stress and urge urinary incontinence  N39 46    2  Pelvic floor tension  M62 89    3  Pelvic floor weakness  N81 89        Patient report that she has been having PFD since 2017  She first addressed it in 2019  She underwent urodynamics due to UUI and urinary urgency in 2019 and was found to be fine  She received 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)               Subjective: Patient reports some mild leakage this past week  Objective: See treatment diary below    Biofeedback performed in supine hooklying  Patient presents with elevated resting tone  Performed 5 second active PFM contractions followed by 10 seconds of rest for 10 repetitions  Muscle activity during work phase measured 26 4 uV on average with the goal being > 12 0uV and muscle activity during rest phase measured 7 9 uV on average with the goal being < 2 5uV  '    Poor resting tone overall with increased tone when movemetn was incorportated  She demonstrated poor motor control with RA and PFM,wnl with TrA and coughing  With repetitive contractions, her PFM were challenged to fully relax  Issued HEP of 5:10 for 3 minutes per day with focus on relaxation phase  Assessment: Tolerated treatment well   Patient would benefit from continued PT      Plan: Continue per plan of care  Add DB nv for downtraining  Precautions: standard      Manuals 3/22 3/31           Gentle downtraining nv            Left intercostal release b/w ribs 11-12  5'                                     Neuro Re-Ed             Breath ed + PFM (she's had previous PT)                                                                                           Ther Ex                          Reformer:                                                                                            Ther Activity             education anatomy and POC x10'            Urge suppression  10'           Gait Training                                       Modalities             biofeedback  40'

## 2022-04-04 ENCOUNTER — HOSPITAL ENCOUNTER (OUTPATIENT)
Dept: ULTRASOUND IMAGING | Facility: HOSPITAL | Age: 32
Discharge: HOME | End: 2022-04-04
Payer: COMMERCIAL

## 2022-04-04 DIAGNOSIS — E80.7 DISORDER OF BILIRUBIN METABOLISM, UNSPECIFIED: ICD-10-CM

## 2022-04-04 PROCEDURE — 76705 ECHO EXAM OF ABDOMEN: CPT

## 2022-04-05 ENCOUNTER — TELEPHONE (OUTPATIENT)
Dept: GASTROENTEROLOGY | Facility: CLINIC | Age: 32
End: 2022-04-05

## 2022-04-05 NOTE — TELEPHONE ENCOUNTER
Patient had left message asking for a call regarding next step regarding her gall bladder  I tried calling her back and received message that the person I am calling isn't accepting calls, to try my call later

## 2022-04-05 NOTE — TELEPHONE ENCOUNTER
Received message from pt asking for a call back to schedule an appt with Dr Jani Young due to [facility] bladder stones  I returned her call, lmom for pt to please call back to schedule

## 2022-04-05 NOTE — TELEPHONE ENCOUNTER
Pt had an US sound done a few days ago  She got the results showing gallstones  She is asking what your opinion is as far as what her next steps should be  She is asking for a call back  Can you call her back or direct me as to what you would like her to do, so I can call her back? Please and thank you

## 2022-04-06 ENCOUNTER — APPOINTMENT (OUTPATIENT)
Dept: PHYSICAL THERAPY | Facility: REHABILITATION | Age: 32
End: 2022-04-06
Payer: COMMERCIAL

## 2022-04-06 NOTE — TELEPHONE ENCOUNTER
Called and lmom for pt with your message  I asked her to call back  If she calls back please update chart and let Dr May Cowan know what she would like to do

## 2022-04-11 ENCOUNTER — NURSE TRIAGE (OUTPATIENT)
Dept: OTHER | Facility: OTHER | Age: 32
End: 2022-04-11

## 2022-04-12 ENCOUNTER — OFFICE VISIT (OUTPATIENT)
Dept: GASTROENTEROLOGY | Facility: CLINIC | Age: 32
End: 2022-04-12
Payer: COMMERCIAL

## 2022-04-12 VITALS
HEIGHT: 62 IN | SYSTOLIC BLOOD PRESSURE: 140 MMHG | DIASTOLIC BLOOD PRESSURE: 88 MMHG | BODY MASS INDEX: 25.03 KG/M2 | WEIGHT: 136 LBS | HEART RATE: 87 BPM

## 2022-04-12 DIAGNOSIS — R10.11 RIGHT UPPER QUADRANT ABDOMINAL PAIN: Primary | ICD-10-CM

## 2022-04-12 DIAGNOSIS — R11.0 NAUSEA: ICD-10-CM

## 2022-04-12 DIAGNOSIS — K82.4 GALL BLADDER POLYP: ICD-10-CM

## 2022-04-12 DIAGNOSIS — K80.20 CALCULUS OF GALLBLADDER WITHOUT CHOLECYSTITIS WITHOUT OBSTRUCTION: ICD-10-CM

## 2022-04-12 PROCEDURE — 99214 OFFICE O/P EST MOD 30 MIN: CPT | Performed by: INTERNAL MEDICINE

## 2022-04-12 RX ORDER — ISOTRETINOIN 20 MG/1
20 CAPSULE, LIQUID FILLED ORAL DAILY
COMMUNITY
Start: 2022-03-10 | End: 2022-05-03

## 2022-04-12 NOTE — TELEPHONE ENCOUNTER
Reason for Disposition   [1] MILD-MODERATE pain AND [2] constant and [3] present < 2 hours    Answer Assessment - Initial Assessment Questions  1  LOCATION: "Where does it hurt?"      Pain in my right side over gall bladder some nausea bur denies vomiting fever   Bowman Disease and bili high US on gallbladder and sludge and stones  2  RADIATION: "Does the pain shoot anywhere else?" (e g , chest, back)      To right shoulder  3  ONSET: "When did the pain begin?" (e g , minutes, hours or days ago)       A few hours ago   4  SUDDEN: "Gradual or sudden onset?"     sudden  5  PATTERN "Does the pain come and go, or is it constant?"     - If constant: "Is it getting better, staying the same, or worsening?"       (Note: Constant means the pain never goes away completely; most serious pain is constant and it progresses)      - If intermittent: "How long does it last?" "Do you have pain now?"      (Note: Intermittent means the pain goes away completely between bouts)     Constant   6  SEVERITY: "How bad is the pain?"  (e g , Scale 1-10; mild, moderate, or severe)    - MILD (1-3): doesn't interfere with normal activities, abdomen soft and not tender to touch     - MODERATE (4-7): interferes with normal activities or awakens from sleep, tender to touch     - SEVERE (8-10): excruciating pain, doubled over, unable to do any normal activities      Rating 3-4   7  RECURRENT SYMPTOM: "Have you ever had this type of stomach pain before?" If Yes, ask: "When was the last time?" and "What happened that time?"      NO  8  CAUSE: "What do you think is causing the stomach pain?"     Gallbladder stones  9  RELIEVING/AGGRAVATING FACTORS: "What makes it better or worse?" (e g , movement, antacids, bowel movement)     Nothing  10  OTHER SYMPTOMS: "Has there been any vomiting, diarrhea, constipation, or urine problems?"       Mild nausea  11   PREGNANCY: "Is there any chance you are pregnant?" "When was your last menstrual period?"      LMP 3/20/22    Protocols used: ABDOMINAL PAIN - [facility]

## 2022-04-12 NOTE — TELEPHONE ENCOUNTER
Called and lmom asking pt to call back to schedule appt with Dr Griffith Clarity or midlevel  I will try calling again in a few days  If she calls back please get her scheduled  Thank you

## 2022-04-12 NOTE — PROGRESS NOTES
[facility] - Outpatient Follow-up Note  Anaya Rod 32 y o  female MRN: [id number]  Encounter: [id number]          ASSESSMENT AND PLAN:      1  Right upper quadrant abdominal pain    2  Calculus of gallbladder without cholecystitis without obstruction    3  Gall bladder polyp    4  Nausea      History of right upper quadrant right flank pain, radiates to her right shoulder, occasional nausea, the pain is not very classical of biliary colic but given multiple stones and young age, she would need gallbladder surgery  She does have some component of IBS/anxiety as well  This was all discussed with the patient, referred to Dr James Guaman  Mild elevation of bilirubin predominantly indirect     ______________________________________________________________________    SUBJECTIVE:      Patient came in for evaluation of her GI symptoms, she has some upper abdominal pain right upper quadrant discomfort with some nausea, mild tenderness in that area comes and goes and now better  Cannot associate this with any specific diet activity food or stress  Denies any dysphagia coughing choking spells, bowels are irregular  Also appears somewhat anxious  Works as an   Diet medications more than 10 pertinent systems and recent EGD colonoscopy results were reviewed  REVIEW OF SYSTEMS IS OTHERWISE NEGATIVE        Historical Information   Past Medical History:   Diagnosis Date    ADD (attention deficit disorder)     Anxiety     Asthma     seasonal, with URI, and cold weather excercise    Colon polyp     Depression     Epigastric pain     occasional nausea    Esophageal pain     occasional    Gastric hypertonus     GERD (gastroesophageal reflux disease)     occasional    Lymphadenopathy, inguinal     PONV (postoperative nausea and vomiting)     Raynaud's disease     Rectal bleeding      Past Surgical History:   Procedure Laterality Date    BUNIONECTOMY  COLONOSCOPY      SUPERFICIAL LYMPH NODE BIOPSY / EXCISION Right     Right groin    UPPER GASTROINTESTINAL ENDOSCOPY       Social History   Social History     Substance and Sexual Activity   Alcohol Use Yes    Comment: occasional     Social History     Substance and Sexual Activity   Drug Use Yes    Types: Marijuana    Comment: medical marijuana-flower not oil     Social History     Tobacco Use   Smoking Status Current Some Day Smoker    Types: Cigars   Smokeless Tobacco Never Used   Tobacco Comment    no cigar today     Family History   Problem Relation Age of Onset    Colon polyps Mother     No Known Problems Father     Colon cancer Maternal Grandfather        Meds/Allergies       Current Outpatient Medications:     Ascorbic Acid (VITAMIN C PO)    betamethasone valerate (LUXIQ) 0 12 % foam    Calcium Carb-Cholecalciferol (CALCIUM+D3 PO)    famotidine (PEPCID) 20 mg tablet    ISOtretinoin (ACCUTANE) 10 MG capsule    lisdexamfetamine (VYVANSE) 30 MG capsule    Omega-3 Fatty Acids (SV FISH OIL PO)    Probiotic Product (PROBIOTIC-10 PO)    VITAMIN A PO    amphetamine-dextroamphetamine (ADDERALL) 20 mg tablet    Dapsone 5 % topical gel    doxycycline (PERIOSTAT) 20 MG tablet    Myorisan 20 MG capsule    nortriptyline (PAMELOR) 10 mg capsule    Allergies   Allergen Reactions    Cefaclor Hives     Hives around mouth           Objective     Blood pressure 140/88, pulse 87, height 5' 2" (1 575 m), weight 61 7 kg (136 lb)  Body mass index is 24 87 kg/m²  PHYSICAL EXAM:      General Appearance:   Alert, cooperative, no distress   HEENT:   Normocephalic, atraumatic, anicteric  Neck:  Supple, symmetrical, trachea midline   Lungs:   Clear to auscultation bilaterally; no rales, rhonchi or wheezing; respirations unlabored    Heart[de-identified]   Regular rate and rhythm; no murmur     Abdomen:   Soft, non-tender, non-distended; normal bowel sounds; no masses, no organomegaly    Genitalia:   Deferred Rectal:   Deferred    Extremities:  No cyanosis, clubbing or edema    Skin:  No jaundice, rashes, or lesions    Lymph nodes:  No palpable cervical lymphadenopathy        Lab Results:   No visits with results within 1 Day(s) from this visit  Latest known visit with results is:   Transcribe Orders on 04/01/2022   Component Date Value    GGT 03/28/2022 17          Radiology Results:   US thyroid    Result Date: 3/28/2022  Narrative: THYROID ULTRASOUND INDICATION:    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  COMPARISON:  None TECHNIQUE:   Ultrasound of the thyroid was performed with a high frequency linear transducer in transverse and sagittal planes including volumetric imaging sweeps as well as traditional still imaging technique  FINDINGS:  Normal homogeneous smooth echotexture  Right lobe: 5 5 x 1 5 x 1 7 cm  Volume 6 5 mL Left lobe:  5 0 x 1 6 x 1 8 cm  Volume 7 0 mL Isthmus: 0 3  cm  No thyroid nodules are demonstrated  Impression: No discrete thyroid nodule is seen  Reference: ACR Thyroid Imaging, Reporting and Data System (TI-RADS): White Paper of the [employer]  J AM Maxwell Radiol 2614;79:694-364  (additional recommendations based on American Thyroid Association 2015 guidelines ) Workstation performed: [id number]     US right upper quadrant    Result Date: 4/4/2022  Narrative: RIGHT UPPER QUADRANT ULTRASOUND INDICATION:     E80 7: Disorder of bilirubin metabolism, unspecified  COMPARISON:  None TECHNIQUE:   Real-time ultrasound of the right upper quadrant was performed with a curvilinear transducer with both volumetric sweeps and still imaging techniques  FINDINGS: PANCREAS:  Visualized portions of the pancreas are within normal limits  AORTA AND IVC:  Visualized portions are normal for patient age  LIVER: Size:  Within normal range  The liver measures 16 3 cm in the midclavicular line  Contour:  Surface contour is smooth   Parenchyma:  Echogenicity and echotexture are within normal limits  No liver mass identified  Limited imaging of the main portal vein shows it to be patent and hepatopetal   BILIARY: The gallbladder is normal in caliber  No wall thickening or pericholecystic fluid  There are multiple calculi and sludge present  Echogenic focus along the gallbladder wall measuring 3 x 3 x 2 mm without posterior shadowing suggests a polyp  According to current literature guidelines (JACR 2013;10:953-956) small polyps this size are benign and no workup or followup is needed  No sonographic Callejas sign  No intrahepatic biliary dilatation  CBD measures 3 0 mm  No choledocholithiasis  KIDNEY: Right kidney measures 10 0 x 3 7 x 4 7 cm  Volume 91 7 mL Kidney within normal limits  ASCITES:   None  Impression: 1  Gallstones/sludge  2   Echogenic focus along the gallbladder wall measuring 3 x 3 x 2 mm without posterior shadowing suggests a polyp  According to current literature guidelines (JACR 2013;10:953-956) small polyps this size are benign and no workup or followup is needed   Workstation performed: [id number]

## 2022-04-12 NOTE — TELEPHONE ENCOUNTER
Regarding: Gall Bladder concern  ----- Message from [facility] sent at 4/11/2022 11:09 PM EDT -----  "I have gall bladder stones and I am having pain/ pulling sensation in my shoulder and ribs and I am feeling fatigue   I just need some guidance on what to do "

## 2022-04-12 NOTE — TELEPHONE ENCOUNTER
Patient called in with mild upper right abdomina pain and mild nausea  Denies fever vomiting and rates discomfort a 3   Recent US found gall stones and sludge  With high bili level  Patients states no yellowing of skin seen and whites of eyes are clear  Home care advice given and patient will call to set up GI appointment tomorrow

## 2022-04-13 NOTE — PROGRESS NOTES
Daily Note     Today's date: 2022  Patient name: Samantha Rao  : 1990  MRN: [id number]  Referring provider: Janee Bonds  Dx:   Encounter Diagnosis     ICD-10-CM    1  Mixed stress and urge urinary incontinence  N39 46    2  Pelvic floor tension  M62 89    3  Pelvic floor weakness  N81 89        Patient report that she has been having PFD since 2017  She first addressed it in 2019  She underwent urodynamics due to UUI and urinary urgency in 2019 and was found to be fine  She [facility] and she notes that she felt at though she hit a plateau       She lost her brother 1 year ago and she notes that the past year has been "terrible" related to stress levels     C/C: UUI while [place] to the [place], the past 2 weeks with flatulence and coughing     Bladder incontinence is occurring daily whether stress or urge     From August - 2021, she [facility]      lives in [place] and she lives in [place]  and 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)               Subjective: Patient reports that she is having daily urine leakage  She was found recently that she has gallstones  [facility] recommended to have a cholestectomy but she feels that she doesn't have time over the next 3 weeks given class schedule  Objective: See treatment diary below      Assessment: Tolerated treatment well  Patient would benefit from continued PT  Visceral release performed in following order: DC, Lateral left diaphragm, pyloric sphincter, TC, hepatic flexure, bladder and R ovary  Fascial restriction noted along entire diaphragm and R>L aspect of bladder  She responded well to release techniques  Internally, muscle spasms were evident on R LA which dissipated after gentle MFR  Instructed in self stretch she can perform with her own dilator   She was also instructed in C/R/REversal to restrore AROM and perform 1x/day for 10 reps  Plan: Continue per plan of care  Add DB nv for downtraining        Precautions: standard      Manuals 3/22 3/31 4/14          Gentle downtraining nv            Left intercostal release b/w ribs 11-12  5' 5'          VM   40'          C/R/RE   10'          Neuro Re-Ed             Breath ed + PFM (she's had previous PT)                                                                                           Ther Ex                          Reformer:   Hip flexor strechx3 each                                                                                        Ther Activity             education anatomy and POC x10'            Urge suppression  10'           Gait Training                                       Modalities             biofeedback  40'

## 2022-04-14 ENCOUNTER — OFFICE VISIT (OUTPATIENT)
Dept: PHYSICAL THERAPY | Facility: REHABILITATION | Age: 32
End: 2022-04-14
Payer: COMMERCIAL

## 2022-04-14 DIAGNOSIS — N39.46 MIXED STRESS AND URGE URINARY INCONTINENCE: Primary | ICD-10-CM

## 2022-04-14 DIAGNOSIS — M62.89 PELVIC FLOOR TENSION: ICD-10-CM

## 2022-04-14 DIAGNOSIS — N81.89 PELVIC FLOOR WEAKNESS: ICD-10-CM

## 2022-04-14 PROCEDURE — 97140 MANUAL THERAPY 1/> REGIONS: CPT | Performed by: PHYSICAL THERAPIST

## 2022-04-15 NOTE — TELEPHONE ENCOUNTER
Left message for patient to call and schedule an appointment  Please call again in 1 week if she doesn't return call  Thank you!

## 2022-04-21 ENCOUNTER — CONSULT (OUTPATIENT)
Dept: SURGERY | Facility: CLINIC | Age: 32
End: 2022-04-21
Payer: COMMERCIAL

## 2022-04-21 VITALS — BODY MASS INDEX: 25.03 KG/M2 | WEIGHT: 136 LBS | HEIGHT: 62 IN

## 2022-04-21 DIAGNOSIS — K80.20 CALCULUS OF GALLBLADDER WITHOUT CHOLECYSTITIS WITHOUT OBSTRUCTION: ICD-10-CM

## 2022-04-21 DIAGNOSIS — R10.11 RIGHT UPPER QUADRANT ABDOMINAL PAIN: ICD-10-CM

## 2022-04-21 PROCEDURE — 99204 OFFICE O/P NEW MOD 45 MIN: CPT | Performed by: SURGERY

## 2022-04-21 NOTE — PROGRESS NOTES
Assessment/Plan:    Diagnoses and all orders for this visit:    Right upper quadrant abdominal pain  -     Ambulatory Referral to General Surgery    Calculus of gallbladder without cholecystitis without obstruction  -     Ambulatory Referral to General Surgery    Discussed risks and benefits of a laparoscopic cholecystectomy including potential bile duct injury, bowel injury, or open surgery and she agrees to proceed  If the equipment is available will proceed with a robotic assisted procedure  Delete to history of Gilbert's syndrome  CBD is 3 mm    Subjective:      Patient ID: Dylan Nick is a 32 y o  female  Patient presents for gallbladder consult  States she has had RUQ "throbbing, pulling" pain for 2 weeks  She has nausea with eating  Initial episode presented with right costal margin pain into the right mid back  Appetite is down  Occasional bloating  Persistent chronic band like  Has been told she has a history of Gilbert's syndrome     Ultrasound RUQ was ordered due to elevated bilirubin 4/4/2022   IMPRESSION:  1  Gallstones/sludge  2   Echogenic focus along the gallbladder wall measuring 3 x 3 x 2 mm without posterior shadowing suggests a polyp  According to current literature guidelines (JACR 2013;10:953-956) small polyps this size are benign and no workup or followup is needed  CBD measured at 3 mm    Abdominal Pain  The current episode started 1 to 4 weeks ago  The problem occurs intermittently  The problem has been unchanged  The pain is located in the RUQ  The abdominal pain radiates to the back and right shoulder  Associated symptoms include flatus and nausea  The pain is aggravated by eating  The pain is relieved by nothing  She has tried nothing for the symptoms  Prior diagnostic workup includes ultrasound         The following portions of the patient's history were reviewed and updated as appropriate:     She  has a past medical history of ADD (attention deficit disorder), Anxiety, Asthma, Colon polyp, Depression, Epigastric pain, Esophageal pain, Gastric hypertonus, GERD (gastroesophageal reflux disease), Lymphadenopathy, inguinal, PONV (postoperative nausea and vomiting), Raynaud's disease, and Rectal bleeding  She  has a past surgical history that includes Superficial lymph node biopsy / excision (Right); Bunionectomy; Colonoscopy; and Upper gastrointestinal endoscopy  Her family history includes Colon cancer in her maternal grandfather; Colon polyps in her mother; No Known Problems in her father  She  reports that she has been smoking cigars  She has never used smokeless tobacco  She reports current alcohol use  She reports current drug use  Drug: Marijuana    Current Outpatient Medications   Medication Sig Dispense Refill    amphetamine-dextroamphetamine (ADDERALL) 20 mg tablet Take 20 mg by mouth daily   (Patient not taking: Reported on 4/12/2022 )      Ascorbic Acid (VITAMIN C PO) Take by mouth      betamethasone valerate (LUXIQ) 0 12 % foam apply to scalp and then rinse after 30 mintues      Calcium Carb-Cholecalciferol (CALCIUM+D3 PO) Take by mouth      Dapsone 5 % topical gel  (Patient not taking: Reported on 4/12/2022 )      doxycycline (PERIOSTAT) 20 MG tablet Take 40 mg by mouth daily (Patient not taking: Reported on 4/12/2022 )      famotidine (PEPCID) 20 mg tablet Take 1 tablet (20 mg total) by mouth 2 (two) times a day 120 tablet 1    ISOtretinoin (ACCUTANE) 10 MG capsule       lisdexamfetamine (VYVANSE) 30 MG capsule Take 30 mg by mouth every morning      Myorisan 20 MG capsule Take 20 mg by mouth daily      nortriptyline (PAMELOR) 10 mg capsule Take 1 capsule (10 mg total) by mouth daily at bedtime (Patient not taking: Reported on 4/12/2022 ) 90 capsule 3    Omega-3 Fatty Acids (SV FISH OIL PO) Take by mouth      Probiotic Product (PROBIOTIC-10 PO) Take by mouth      VITAMIN A PO Take by mouth       No current facility-administered medications for this visit  She is allergic to cefaclor       Review of Systems   Gastrointestinal: Positive for abdominal pain, flatus and nausea  All other systems reviewed and are negative  Objective:      Ht 5' 2" (1 575 m)   Wt 61 7 kg (136 lb)   BMI 24 87 kg/m²          Physical Exam  Constitutional:       General: She is not in acute distress  HENT:      Head: Atraumatic  Eyes:      General: No scleral icterus  Cardiovascular:      Rate and Rhythm: Normal rate  Pulmonary:      Effort: Pulmonary effort is normal    Abdominal:      General: Abdomen is flat  Bowel sounds are normal       Palpations: Abdomen is soft  Tenderness: There is no abdominal tenderness  Hernia: No hernia is present  Skin:     General: Skin is warm and dry  Neurological:      Mental Status: She is alert         extremities:  No edema

## 2022-04-21 NOTE — H&P (VIEW-ONLY)
Assessment/Plan:    Diagnoses and all orders for this visit:    Right upper quadrant abdominal pain  -     Ambulatory Referral to General Surgery    Calculus of gallbladder without cholecystitis without obstruction  -     Ambulatory Referral to General Surgery    Discussed risks and benefits of a laparoscopic cholecystectomy including potential bile duct injury, bowel injury, or open surgery and she agrees to proceed  If the equipment is available will proceed with a robotic assisted procedure  Delete to history of Gilbert's syndrome  CBD is 3 mm    Subjective:      Patient ID: Evi Ridley is a 32 y o  female  Patient presents for gallbladder consult  States she has had RUQ "throbbing, pulling" pain for 2 weeks  She has nausea with eating  Initial episode presented with right costal margin pain into the right mid back  Appetite is down  Occasional bloating  Persistent chronic band like  Has been told she has a history of Gilbert's syndrome     Ultrasound RUQ was ordered due to elevated bilirubin 4/4/2022   IMPRESSION:  1  Gallstones/sludge  2   Echogenic focus along the gallbladder wall measuring 3 x 3 x 2 mm without posterior shadowing suggests a polyp  According to current literature guidelines (JACR 2013;10:953-956) small polyps this size are benign and no workup or followup is needed  CBD measured at 3 mm    Abdominal Pain  The current episode started 1 to 4 weeks ago  The problem occurs intermittently  The problem has been unchanged  The pain is located in the RUQ  The abdominal pain radiates to the back and right shoulder  Associated symptoms include flatus and nausea  The pain is aggravated by eating  The pain is relieved by nothing  She has tried nothing for the symptoms  Prior diagnostic workup includes ultrasound         The following portions of the patient's history were reviewed and updated as appropriate:     She  has a past medical history of ADD (attention deficit disorder), Anxiety, Asthma, Colon polyp, Depression, Epigastric pain, Esophageal pain, Gastric hypertonus, GERD (gastroesophageal reflux disease), Lymphadenopathy, inguinal, PONV (postoperative nausea and vomiting), Raynaud's disease, and Rectal bleeding  She  has a past surgical history that includes Superficial lymph node biopsy / excision (Right); Bunionectomy; Colonoscopy; and Upper gastrointestinal endoscopy  Her family history includes Colon cancer in her maternal grandfather; Colon polyps in her mother; No Known Problems in her father  She  reports that she has been smoking cigars  She has never used smokeless tobacco  She reports current alcohol use  She reports current drug use  Drug: Marijuana    Current Outpatient Medications   Medication Sig Dispense Refill    amphetamine-dextroamphetamine (ADDERALL) 20 mg tablet Take 20 mg by mouth daily   (Patient not taking: Reported on 4/12/2022 )      Ascorbic Acid (VITAMIN C PO) Take by mouth      betamethasone valerate (LUXIQ) 0 12 % foam apply to scalp and then rinse after 30 mintues      Calcium Carb-Cholecalciferol (CALCIUM+D3 PO) Take by mouth      Dapsone 5 % topical gel  (Patient not taking: Reported on 4/12/2022 )      doxycycline (PERIOSTAT) 20 MG tablet Take 40 mg by mouth daily (Patient not taking: Reported on 4/12/2022 )      famotidine (PEPCID) 20 mg tablet Take 1 tablet (20 mg total) by mouth 2 (two) times a day 120 tablet 1    ISOtretinoin (ACCUTANE) 10 MG capsule       lisdexamfetamine (VYVANSE) 30 MG capsule Take 30 mg by mouth every morning      Myorisan 20 MG capsule Take 20 mg by mouth daily      nortriptyline (PAMELOR) 10 mg capsule Take 1 capsule (10 mg total) by mouth daily at bedtime (Patient not taking: Reported on 4/12/2022 ) 90 capsule 3    Omega-3 Fatty Acids (SV FISH OIL PO) Take by mouth      Probiotic Product (PROBIOTIC-10 PO) Take by mouth      VITAMIN A PO Take by mouth       No current facility-administered medications for this visit  She is allergic to cefaclor       Review of Systems   Gastrointestinal: Positive for abdominal pain, flatus and nausea  All other systems reviewed and are negative  Objective:      Ht 5' 2" (1 575 m)   Wt 61 7 kg (136 lb)   BMI 24 87 kg/m²          Physical Exam  Constitutional:       General: She is not in acute distress  HENT:      Head: Atraumatic  Eyes:      General: No scleral icterus  Cardiovascular:      Rate and Rhythm: Normal rate  Pulmonary:      Effort: Pulmonary effort is normal    Abdominal:      General: Abdomen is flat  Bowel sounds are normal       Palpations: Abdomen is soft  Tenderness: There is no abdominal tenderness  Hernia: No hernia is present  Skin:     General: Skin is warm and dry  Neurological:      Mental Status: She is alert         extremities:  No edema

## 2022-04-25 ENCOUNTER — PREP FOR PROCEDURE (OUTPATIENT)
Dept: SURGERY | Facility: CLINIC | Age: 32
End: 2022-04-25

## 2022-04-25 DIAGNOSIS — K80.10 CALCULOUS CHOLECYSTITIS: Primary | ICD-10-CM

## 2022-04-25 NOTE — PROGRESS NOTES
Daily Note     Today's date: 2022  Patient name: Franklin Ashraf  : 1990  MRN: [id number]  Referring provider: Johana Kevin  Dx:   Encounter Diagnosis     ICD-10-CM    1  Mixed stress and urge urinary incontinence  N39 46    2  Pelvic floor tension  M62 89    3  Pelvic floor weakness  N81 89        Patient report that she has been having PFD since 2017  She first addressed it in 2019  She underwent urodynamics due to UUI and urinary urgency in 2019 and was found to be fine  She received PFPT x 2 months at [facility] with MT and she notes that she felt at though she hit a plateau       She lost her brother 1 year ago and she notes that the past year has been "terrible" related to stress levels     C/C: UUI [place], the past 2 weeks with flatulence and coughing     Bladder incontinence is occurring daily whether stress or urge     From August - 2021, she received PFPT at [facility]      lives in [place] and she lives in [place] [facility] [place] and 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)               Subjective: Patient reports that she is having daily urine leakage and increased urgency especially when she is stressed which has been very high  Rates pain in R hip at -2/10 - labral tear "catches on and off " She notices some pain in QL  She is scheduled for cholecystectomy on 5/10/22  Graduation on 22  Objective: See treatment diary below      Assessment: Tolerated treatment well  Patient would benefit from continued PT  Persistent leakage despite compliance with HEP  Will introduce some up-regulation nv if she appears to be able to relax appropriately  She notes compliance with this over the past week or so  She had some fascial restriction along B bladder poles in medial directions  Instructed in self mobilization   Also recommended dilator place and hold and letter "C" tracing for a total of 5-10 minutes, 5 times a week  We also discussed anterior bias in sitting with slight forward trunk lean  Plan: Continue per plan of care  Add some co-contractions on 5/3 if she can relax appropriately        Precautions: standard      Manuals 3/22 3/31 4/14 4/26         Gentle downtraining nv            Left intercostal release b/w ribs 11-12  5' 5'          VM   40' 10'         C/R/RE   10'          Breath ed + PFM (she's had previous PT)             Bladder mobilization     x10'         Gentle PFM release     x20                                                             Ther Ex                          Reformer:   Hip flexor strechx3 each          PFM C/R with focus on relax in sitting    5x10 - fwd shift for anterior bias                                                                          Ther Activity             education anatomy and POC x10'   Self stretches w/ dilator, breath focus x8'         Urge suppression  10'           Gait Training                                       Modalities             biofeedback  40'

## 2022-04-26 ENCOUNTER — OFFICE VISIT (OUTPATIENT)
Dept: PHYSICAL THERAPY | Facility: REHABILITATION | Age: 32
End: 2022-04-26
Payer: COMMERCIAL

## 2022-04-26 DIAGNOSIS — M62.89 PELVIC FLOOR TENSION: ICD-10-CM

## 2022-04-26 DIAGNOSIS — N39.46 MIXED STRESS AND URGE URINARY INCONTINENCE: Primary | ICD-10-CM

## 2022-04-26 DIAGNOSIS — N81.89 PELVIC FLOOR WEAKNESS: ICD-10-CM

## 2022-04-26 PROCEDURE — 97140 MANUAL THERAPY 1/> REGIONS: CPT | Performed by: PHYSICAL THERAPIST

## 2022-04-26 PROCEDURE — 97110 THERAPEUTIC EXERCISES: CPT | Performed by: PHYSICAL THERAPIST

## 2022-04-26 PROCEDURE — 97530 THERAPEUTIC ACTIVITIES: CPT | Performed by: PHYSICAL THERAPIST

## 2022-04-28 ENCOUNTER — APPOINTMENT (OUTPATIENT)
Dept: LAB | Facility: HOSPITAL | Age: 32
End: 2022-04-28
Payer: COMMERCIAL

## 2022-04-28 DIAGNOSIS — R53.83 FATIGUE, UNSPECIFIED TYPE: ICD-10-CM

## 2022-04-28 LAB
T4 FREE SERPL-MCNC: 1.21 NG/DL (ref 0.76–1.46)
TSH SERPL DL<=0.05 MIU/L-ACNC: 0.38 UIU/ML (ref 0.45–4.5)

## 2022-04-28 PROCEDURE — 36415 COLL VENOUS BLD VENIPUNCTURE: CPT

## 2022-04-28 PROCEDURE — 84439 ASSAY OF FREE THYROXINE: CPT

## 2022-05-02 NOTE — PROGRESS NOTES
Daily Note     Today's date: 5/3/2022  Patient name: Venu Freitas  : 1990  MRN: [id number]  Referring provider: Herve Low  Dx:   Encounter Diagnosis     ICD-10-CM    1  Mixed stress and urge urinary incontinence  N39 46    2  Pelvic floor tension  M62 89    3  Pelvic floor weakness  N81 89        Patient report that she has been having PFD since 2017  She first addressed it in   She underwent urodynamics due to UUI and urinary urgency in 2019 and was found to be fine  She received PFPT x 2 months at [facility] with MT and she notes that she felt at though she hit a plateau       She lost her brother 1 year ago and she notes that the past year has been "terrible" related to stress levels     C/C: [place], the past 2 weeks with flatulence and coughing     Bladder incontinence is occurring daily whether stress or urge     From August - 2021, she received PFPT at [facility]      lives in [place] and she lives in [place] and 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)               Subjective: Patient reports that she is having daily urine leakage and increased urgency especially when she is stressed which has been very high  Reports that when her SIJ has been out of whack she has pain and tingling down the back of her leg  She continues to feel that it is difficult to performShe is scheduled for cholecystectomy on 5/10/22  Graduation from [employer] on 22  Objective: See treatment diary below      Assessment: Tolerated treatment well  Patient would benefit from continued PT  We were able to add some focused PFM concentric contractions this session afer focusing primarily on relaxation and lengthening activities the past 4 session  She is able to relax to baseline with focus  We needed to remind her to breathe deeply, slowly and not too forcefully so that she didn't overuse her intercostals  She can feel her pelvic floor engage but she is not happy with how weak it feels or her lack of endurance (currently 7 seconds x 3 reps)  She had one involuntary spasm during treatment lasting about 5 seconds  She was able to breathe deeply and relax and this improved  Also focused on reverse kegels and advised to do these if/when she feels pelvic floor muscle tension  Plan: Continue per plan of care  Add some co-contractions on 5/9 if she can relax appropriately        Precautions: standard      Manuals 3/22 3/31 4/14 4/26 5/3        Gentle downtraining nv    10'        Left intercostal release b/w ribs 11-12  5' 5'          VM   40' 10'         C/R/RE   10'          Breath ed + PFM (she's had previous PT)     13'        Bladder mobilization     x10' 10'        Gentle PFM release     x20 20'                                                            Ther Ex                          Reformer:   Hip flexor strechx3 each          PFM C/R with focus on relax in sitting    5x10 - fwd shift for anterior bias         Prone press ups     5'                                                            Ther Activity             education anatomy and POC x10'   Self stretches w/ dilator, breath focus x8'         Urge suppression  10'           Gait Training                                       Modalities             biofeedback  40'

## 2022-05-03 ENCOUNTER — OFFICE VISIT (OUTPATIENT)
Dept: PHYSICAL THERAPY | Facility: REHABILITATION | Age: 32
End: 2022-05-03
Payer: COMMERCIAL

## 2022-05-03 ENCOUNTER — APPOINTMENT (OUTPATIENT)
Dept: LAB | Facility: HOSPITAL | Age: 32
End: 2022-05-03
Payer: COMMERCIAL

## 2022-05-03 DIAGNOSIS — N39.46 MIXED STRESS AND URGE URINARY INCONTINENCE: Primary | ICD-10-CM

## 2022-05-03 DIAGNOSIS — R53.83 FATIGUE, UNSPECIFIED TYPE: ICD-10-CM

## 2022-05-03 DIAGNOSIS — N81.89 PELVIC FLOOR WEAKNESS: ICD-10-CM

## 2022-05-03 DIAGNOSIS — M62.89 PELVIC FLOOR TENSION: ICD-10-CM

## 2022-05-03 LAB — T3 SERPL-MCNC: 1.1 NG/ML (ref 0.6–1.8)

## 2022-05-03 PROCEDURE — 36415 COLL VENOUS BLD VENIPUNCTURE: CPT

## 2022-05-03 PROCEDURE — 97140 MANUAL THERAPY 1/> REGIONS: CPT | Performed by: PHYSICAL THERAPIST

## 2022-05-03 PROCEDURE — 84480 ASSAY TRIIODOTHYRONINE (T3): CPT

## 2022-05-03 RX ORDER — ISOTRETINOIN 20 MG/1
20 CAPSULE ORAL EVERY OTHER DAY
COMMUNITY

## 2022-05-03 NOTE — PRE-PROCEDURE INSTRUCTIONS
Pre-Surgery Instructions:   Medication Instructions    Ascorbic Acid (VITAMIN C PO) Stop taking 7 days prior to surgery   betamethasone valerate (LUXIQ) 0 12 % foam Hold day of surgery   Calcium Carb-Cholecalciferol (CALCIUM+D3 PO) Stop taking 7 days prior to surgery   famotidine (PEPCID) 20 mg tablet Uses PRN- OK to take day of surgery    ISOtretinoin (ACCUTANE) 20 MG capsule Take day of surgery   lisdexamfetamine (VYVANSE) 30 MG capsule Hold day of surgery   nortriptyline (PAMELOR) 10 mg capsule Take night before surgery    Omega-3 Fatty Acids (SV FISH OIL PO) Stop taking 7 days prior to surgery   Probiotic Product (PROBIOTIC-10 PO) Hold day of surgery  Pt denies fever, sob, sore throat and cough  Pt instructed to stop nsaids and supplements one week prior to surgery  Pt verbalized understanding of shower and med instructions

## 2022-05-09 ENCOUNTER — ANESTHESIA EVENT (OUTPATIENT)
Dept: PERIOP | Facility: HOSPITAL | Age: 32
End: 2022-05-09
Payer: COMMERCIAL

## 2022-05-09 ENCOUNTER — OFFICE VISIT (OUTPATIENT)
Dept: PHYSICAL THERAPY | Facility: REHABILITATION | Age: 32
End: 2022-05-09
Payer: COMMERCIAL

## 2022-05-09 DIAGNOSIS — M62.89 PELVIC FLOOR TENSION: ICD-10-CM

## 2022-05-09 DIAGNOSIS — N81.89 PELVIC FLOOR WEAKNESS: ICD-10-CM

## 2022-05-09 DIAGNOSIS — N39.46 MIXED STRESS AND URGE URINARY INCONTINENCE: Primary | ICD-10-CM

## 2022-05-09 PROCEDURE — 97140 MANUAL THERAPY 1/> REGIONS: CPT

## 2022-05-09 PROCEDURE — 97110 THERAPEUTIC EXERCISES: CPT

## 2022-05-09 NOTE — PROGRESS NOTES
Daily Note     Today's date: 2022  Patient name: Clara Barlow  : 1990  MRN: [id number]  Referring provider: Rey Hong  Dx:   Encounter Diagnosis     ICD-10-CM    1  Mixed stress and urge urinary incontinence  N39 46    2  Pelvic floor tension  M62 89    3  Pelvic floor weakness  N81 89        Patient report that she has been having PFD since 2017  She first addressed it in 2019  She underwent urodynamics due to UUI and urinary urgency in 2019 and was found to be fine  She received PFPT x 2 months at [facility] with MT and she notes that she felt at though she hit a plateau       She lost her brother 1 year ago and she notes that the past year has been "terrible" related to stress levels     C/C: [place], the past 2 weeks with flatulence and coughing     Bladder incontinence is occurring daily whether stress or urge     From August - 2021, she received PFPT at [facility]      lives in [place] and she lives in [place] and 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)    Start Time: 0900  Stop Time: 1000  Total time in clinic (min): 60 minutes    Subjective: Pt reports having more leakage and feels her hips are bothering more including the right one  Is doing some stretching at home with wand (supine)  She is scheduled for cholecystectomy on 5/10/22  Graduation from [employer] on 22  Objective: See treatment diary below      Assessment: Tolerated treatment well  Patient would benefit from continued PT  Pt appears challenged to fully relax, benefits from cueing and MFR  Periurethral and compressor urethra release reproduced low abdominal pulling  Pt encouraged to continue with her current HEP as she has a procedure tomorrow  No co-contractions added today due to still being challenged with relaxing  Discussed resting her feet against the wall for a different position with self MFR      Plan: Continue per plan of care  Add some co-contractions on 5/17 if she can relax appropriately  Precautions: standard      Manuals 3/22 3/31 4/14 4/26 5/3 5/09       Gentle downtraining nv    10'        Left intercostal release b/w ribs 11-12  5' 5'          VM   40' 10'         C/R/RE   10'   5'       Breath ed + PFM (she's had previous PT)     13'        Bladder mobilization     x10' 10' 10'       Gentle PFM release     x20 20' 25' incl   periurethral & compressor urethra release       Bowel massage      10                                              Ther Ex                          Reformer:   Hip flexor strechx3 each   x3       PFM C/R with focus on relax in sitting    5x10 - fwd shift for anterior bias         Prone press ups     5' 5x w/ OP       Happy baby      2'                                              Ther Activity             education anatomy and POC x10'   Self stretches w/ dilator, breath focus x8'         Urge suppression  10'           Gait Training                                       Modalities             biofeedback  40'

## 2022-05-10 ENCOUNTER — HOSPITAL ENCOUNTER (OUTPATIENT)
Facility: HOSPITAL | Age: 32
Setting detail: OUTPATIENT SURGERY
Discharge: HOME | End: 2022-05-10
Attending: SURGERY | Admitting: SURGERY
Payer: COMMERCIAL

## 2022-05-10 ENCOUNTER — APPOINTMENT (OUTPATIENT)
Dept: PHYSICAL THERAPY | Facility: REHABILITATION | Age: 32
End: 2022-05-10
Payer: COMMERCIAL

## 2022-05-10 ENCOUNTER — ANESTHESIA (OUTPATIENT)
Dept: PERIOP | Facility: HOSPITAL | Age: 32
End: 2022-05-10
Payer: COMMERCIAL

## 2022-05-10 VITALS
BODY MASS INDEX: 25.03 KG/M2 | HEART RATE: 64 BPM | OXYGEN SATURATION: 100 % | WEIGHT: 136 LBS | RESPIRATION RATE: 13 BRPM | TEMPERATURE: 97 F | DIASTOLIC BLOOD PRESSURE: 83 MMHG | HEIGHT: 62 IN | SYSTOLIC BLOOD PRESSURE: 117 MMHG

## 2022-05-10 DIAGNOSIS — K80.10 CALCULOUS CHOLECYSTITIS: Primary | ICD-10-CM

## 2022-05-10 LAB
EXT PREGNANCY TEST URINE: NEGATIVE
EXT. CONTROL: NORMAL

## 2022-05-10 PROCEDURE — 88304 TISSUE EXAM BY PATHOLOGIST: CPT | Performed by: PATHOLOGY

## 2022-05-10 PROCEDURE — 47562 LAPAROSCOPIC CHOLECYSTECTOMY: CPT | Performed by: SURGERY

## 2022-05-10 PROCEDURE — 81025 URINE PREGNANCY TEST: CPT | Performed by: SURGERY

## 2022-05-10 RX ORDER — ONDANSETRON 2 MG/ML
4 INJECTION INTRAMUSCULAR; INTRAVENOUS ONCE AS NEEDED
Status: COMPLETED | OUTPATIENT
Start: 2022-05-10 | End: 2022-05-10

## 2022-05-10 RX ORDER — PROMETHAZINE HYDROCHLORIDE 25 MG/ML
12.5 INJECTION, SOLUTION INTRAMUSCULAR; INTRAVENOUS ONCE AS NEEDED
Status: DISCONTINUED | OUTPATIENT
Start: 2022-05-10 | End: 2022-05-10 | Stop reason: HOSPADM

## 2022-05-10 RX ORDER — BUPIVACAINE HYDROCHLORIDE AND EPINEPHRINE 2.5; 5 MG/ML; UG/ML
INJECTION, SOLUTION EPIDURAL; INFILTRATION; INTRACAUDAL; PERINEURAL AS NEEDED
Status: DISCONTINUED | OUTPATIENT
Start: 2022-05-10 | End: 2022-05-10 | Stop reason: HOSPADM

## 2022-05-10 RX ORDER — GLYCOPYRROLATE 0.2 MG/ML
INJECTION INTRAMUSCULAR; INTRAVENOUS AS NEEDED
Status: DISCONTINUED | OUTPATIENT
Start: 2022-05-10 | End: 2022-05-10

## 2022-05-10 RX ORDER — FENTANYL CITRATE/PF 50 MCG/ML
25 SYRINGE (ML) INJECTION
Status: DISCONTINUED | OUTPATIENT
Start: 2022-05-10 | End: 2022-05-10 | Stop reason: HOSPADM

## 2022-05-10 RX ORDER — ONDANSETRON 2 MG/ML
INJECTION INTRAMUSCULAR; INTRAVENOUS AS NEEDED
Status: DISCONTINUED | OUTPATIENT
Start: 2022-05-10 | End: 2022-05-10

## 2022-05-10 RX ORDER — DEXAMETHASONE SODIUM PHOSPHATE 10 MG/ML
INJECTION, SOLUTION INTRAMUSCULAR; INTRAVENOUS AS NEEDED
Status: DISCONTINUED | OUTPATIENT
Start: 2022-05-10 | End: 2022-05-10

## 2022-05-10 RX ORDER — SODIUM CHLORIDE, SODIUM LACTATE, POTASSIUM CHLORIDE, CALCIUM CHLORIDE 600; 310; 30; 20 MG/100ML; MG/100ML; MG/100ML; MG/100ML
125 INJECTION, SOLUTION INTRAVENOUS CONTINUOUS
Status: DISCONTINUED | OUTPATIENT
Start: 2022-05-10 | End: 2022-05-10 | Stop reason: HOSPADM

## 2022-05-10 RX ORDER — MIDAZOLAM HYDROCHLORIDE 2 MG/2ML
INJECTION, SOLUTION INTRAMUSCULAR; INTRAVENOUS AS NEEDED
Status: DISCONTINUED | OUTPATIENT
Start: 2022-05-10 | End: 2022-05-10

## 2022-05-10 RX ORDER — HYDROMORPHONE HCL/PF 1 MG/ML
0.5 SYRINGE (ML) INJECTION
Status: DISCONTINUED | OUTPATIENT
Start: 2022-05-10 | End: 2022-05-10 | Stop reason: HOSPADM

## 2022-05-10 RX ORDER — OXYCODONE HYDROCHLORIDE 5 MG/1
5 TABLET ORAL EVERY 4 HOURS PRN
Qty: 10 TABLET | Refills: 0 | Status: SHIPPED | OUTPATIENT
Start: 2022-05-10 | End: 2022-05-20

## 2022-05-10 RX ORDER — OXYCODONE HYDROCHLORIDE 5 MG/1
5 TABLET ORAL EVERY 4 HOURS PRN
Status: DISCONTINUED | OUTPATIENT
Start: 2022-05-10 | End: 2022-05-10 | Stop reason: HOSPADM

## 2022-05-10 RX ORDER — NEOSTIGMINE METHYLSULFATE 1 MG/ML
INJECTION INTRAVENOUS AS NEEDED
Status: DISCONTINUED | OUTPATIENT
Start: 2022-05-10 | End: 2022-05-10

## 2022-05-10 RX ORDER — MEPERIDINE HYDROCHLORIDE 25 MG/ML
12.5 INJECTION INTRAMUSCULAR; INTRAVENOUS; SUBCUTANEOUS ONCE
Status: DISCONTINUED | OUTPATIENT
Start: 2022-05-10 | End: 2022-05-10 | Stop reason: HOSPADM

## 2022-05-10 RX ORDER — ALBUTEROL SULFATE 2.5 MG/3ML
2.5 SOLUTION RESPIRATORY (INHALATION) ONCE AS NEEDED
Status: DISCONTINUED | OUTPATIENT
Start: 2022-05-10 | End: 2022-05-10 | Stop reason: HOSPADM

## 2022-05-10 RX ORDER — PROPOFOL 10 MG/ML
INJECTION, EMULSION INTRAVENOUS AS NEEDED
Status: DISCONTINUED | OUTPATIENT
Start: 2022-05-10 | End: 2022-05-10

## 2022-05-10 RX ORDER — LABETALOL HYDROCHLORIDE 5 MG/ML
5 INJECTION, SOLUTION INTRAVENOUS
Status: DISCONTINUED | OUTPATIENT
Start: 2022-05-10 | End: 2022-05-10 | Stop reason: HOSPADM

## 2022-05-10 RX ORDER — KETOROLAC TROMETHAMINE 30 MG/ML
INJECTION, SOLUTION INTRAMUSCULAR; INTRAVENOUS AS NEEDED
Status: DISCONTINUED | OUTPATIENT
Start: 2022-05-10 | End: 2022-05-10

## 2022-05-10 RX ORDER — LIDOCAINE HYDROCHLORIDE 10 MG/ML
0.5 INJECTION, SOLUTION EPIDURAL; INFILTRATION; INTRACAUDAL; PERINEURAL ONCE AS NEEDED
Status: DISCONTINUED | OUTPATIENT
Start: 2022-05-10 | End: 2022-05-10 | Stop reason: HOSPADM

## 2022-05-10 RX ORDER — ONDANSETRON 2 MG/ML
4 INJECTION INTRAMUSCULAR; INTRAVENOUS EVERY 4 HOURS PRN
Status: DISCONTINUED | OUTPATIENT
Start: 2022-05-10 | End: 2022-05-10 | Stop reason: HOSPADM

## 2022-05-10 RX ORDER — ROCURONIUM BROMIDE 10 MG/ML
INJECTION, SOLUTION INTRAVENOUS AS NEEDED
Status: DISCONTINUED | OUTPATIENT
Start: 2022-05-10 | End: 2022-05-10

## 2022-05-10 RX ORDER — FENTANYL CITRATE 50 UG/ML
INJECTION, SOLUTION INTRAMUSCULAR; INTRAVENOUS AS NEEDED
Status: DISCONTINUED | OUTPATIENT
Start: 2022-05-10 | End: 2022-05-10

## 2022-05-10 RX ORDER — CLINDAMYCIN PHOSPHATE 900 MG/50ML
900 INJECTION INTRAVENOUS ONCE
Status: COMPLETED | OUTPATIENT
Start: 2022-05-10 | End: 2022-05-10

## 2022-05-10 RX ADMIN — CLINDAMYCIN PHOSPHATE 900 MG: 900 INJECTION, SOLUTION INTRAVENOUS at 11:13

## 2022-05-10 RX ADMIN — MIDAZOLAM 2 MG: 1 INJECTION INTRAMUSCULAR; INTRAVENOUS at 11:06

## 2022-05-10 RX ADMIN — SODIUM CHLORIDE, SODIUM LACTATE, POTASSIUM CHLORIDE, AND CALCIUM CHLORIDE 125 ML/HR: .6; .31; .03; .02 INJECTION, SOLUTION INTRAVENOUS at 09:30

## 2022-05-10 RX ADMIN — DEXAMETHASONE SODIUM PHOSPHATE 10 MG: 10 INJECTION, SOLUTION INTRAMUSCULAR; INTRAVENOUS at 11:26

## 2022-05-10 RX ADMIN — ROCURONIUM BROMIDE 50 MG: 50 INJECTION INTRAVENOUS at 11:12

## 2022-05-10 RX ADMIN — NEOSTIGMINE METHYLSULFATE 4 MG: 1 INJECTION INTRAVENOUS at 11:53

## 2022-05-10 RX ADMIN — ONDANSETRON 4 MG: 2 INJECTION INTRAMUSCULAR; INTRAVENOUS at 12:48

## 2022-05-10 RX ADMIN — SUGAMMADEX 200 MG: 100 INJECTION, SOLUTION INTRAVENOUS at 11:57

## 2022-05-10 RX ADMIN — PROPOFOL 170 MG: 10 INJECTION, EMULSION INTRAVENOUS at 11:12

## 2022-05-10 RX ADMIN — FENTANYL CITRATE 100 MCG: 50 INJECTION INTRAMUSCULAR; INTRAVENOUS at 11:12

## 2022-05-10 RX ADMIN — ONDANSETRON 4 MG: 2 INJECTION INTRAMUSCULAR; INTRAVENOUS at 11:47

## 2022-05-10 RX ADMIN — KETOROLAC TROMETHAMINE 30 MG: 30 INJECTION, SOLUTION INTRAMUSCULAR at 11:50

## 2022-05-10 RX ADMIN — GLYCOPYRROLATE 0.6 MG: 0.2 INJECTION, SOLUTION INTRAMUSCULAR; INTRAVENOUS at 11:53

## 2022-05-10 NOTE — OP NOTE
OPERATIVE REPORT  PATIENT NAME: Rosario De Jesus    :  1990  MRN: [id number]  Pt Location: [facility] OR ROOM 05    SURGERY DATE: 5/10/2022    Surgeon(s) and Role:     * Eli Britton,  - Primary     * Liliane Young MD - Assisting    Preop Diagnosis:  Calculous cholecystitis [K80 10]    Post-Op Diagnosis Codes:     * Calculous cholecystitis [K80 10]    Procedure(s) (LRB):  CHOLECYSTECTOMY LAPAROSCOPIC (possibly open) (N/A)    Specimen(s):  ID Type Source Tests Collected by Time Destination   1 : gallbladder Tissue Gallbladder TISSUE EXAM Alvarez Nazario, DO 5/10/2022 1140        Estimated Blood Loss:   Minimal    Drains:  * No LDAs found *    Anesthesia Type:   General/local    Operative Indications:  Calculous cholecystitis [K80 10]      Operative Findings:  Height 62 in weight 62 kg/136 lb  BMI 25  ASA 1  Wound class 2    Complications:   None    Procedure and Technique:  Patient was brought the operative suite and identified by visualization, conversation, by armband  Sequential compression pumps were placed  She was given clindamycin perioperatively  Once under anesthesia abdomen is then prepped and draped in a sterile fashion  Time-out was performed was assured that the prep was dry  Local was instilled at the infraumbilical fold  Small vertical skin incision was made in the subcutaneous tissues were bluntly dissected with Kocher clamps down to the fascia  Fascia lifted up and divided the midline  I then poked through the underlying peritoneum with a hemostat gaining access into the abdominal cavity  11 mm trocar was placed under direct visualization  CO2 was attached creating pneumoperitoneum 5 mm bladeless trocar was then placed in the epigastric site  Another 5 mm trocar was placed in the midclavicular line  With these 3 trocars were able to proceed with our procedure  Gallbladder lifted cephalad    Harmonic scalp was used to carefully divide the visceral peritoneum around the cystic duct and cystic artery  These were clearly identified and divided to Ligaclips  Gallbladder was then taken off the liver with variable speed Harmonic scalpel  There was excellent hemostasis  Once off the liver bed gallbladder was placed into an Endo-Catch bag  Irrigation was carried out  Gallbladder then brought to the umbilical port site once the pneumoperitoneum had been evacuated  Other trocars removed  Fascia at the umbilical site was closed using 0 Vicryl in a figure-of-eight fashion  Local was instilled  Four Monocryl was used to close all skin incisions in a subcuticular fashion  Wounds were washed and dried  Sterile skin glue was applied  She was awakened in the operating returned to the recovery area in stable condition having tolerated the procedure well     I was present for the entire procedure    Patient Disposition:  PACU       SIGNATURE: Kekewnaresh Samson,   DATE: May 10, 2022  TIME: 11:54 AM

## 2022-05-10 NOTE — ANESTHESIA POSTPROCEDURE EVALUATION
Post-Op Assessment Note    CV Status:  Stable  Pain Score: 0    Pain management: adequate     Mental Status:  Alert and awake   Hydration Status:  Euvolemic   PONV Controlled:  Controlled   Airway Patency:  Patent      Post Op Vitals Reviewed: Yes      Staff: CRNA, Anesthesiologist         No complications documented      BP   124/73   Temp  97 8   Pulse  90   Resp   14   SpO2   100

## 2022-05-10 NOTE — ANESTHESIA PREPROCEDURE EVALUATION
Procedure:  CHOLECYSTECTOMY LAPAROSCOPIC (possibly open) (N/A Abdomen)  CHOLECYSTECTOMY (N/A Abdomen)    Relevant Problems   GI/HEPATIC   (+) Rectal bleeding      Other   (+) Lymphadenopathy, inguinal        Physical Exam    Airway    Mallampati score: II  TM Distance: >3 FB  Neck ROM: full     Dental   No notable dental hx     Cardiovascular  Rhythm: regular, Rate: normal, Cardiovascular exam normal    Pulmonary  Pulmonary exam normal     Other Findings        Anesthesia Plan  ASA Score- 2     Anesthesia Type- general with ASA Monitors  Additional Monitors:   Airway Plan: ETT  Comment: Patient seen and examined  History reviewed  Patient to be done under general anesthesia with ETT and routine monitors  Risks discussed with the patient  Consent obtained          Plan Factors-Exercise tolerance (METS): >4 METS  Chart reviewed  Existing labs reviewed  Patient summary reviewed  Patient is not a current smoker  Patient not instructed to abstain from smoking on day of procedure  Patient did not smoke on day of surgery  Induction- intravenous  Postoperative Plan- Plan for postoperative opioid use  Planned trial extubation    Informed Consent- Anesthetic plan and risks discussed with patient  I personally reviewed this patient with the CRNA  Discussed and agreed on the Anesthesia Plan with the CRNA  Chanda Ortiz

## 2022-05-10 NOTE — INTERVAL H&P NOTE
H&P reviewed  After examining the patient I find no changes in the patients condition since the H&P had been written      Vitals:    05/10/22 0910   BP: 134/84   Pulse: 68   Resp: 18   Temp: (!) 96 4 °F (35 8 °C)   SpO2: 100%

## 2022-05-12 ENCOUNTER — APPOINTMENT (OUTPATIENT)
Dept: PHYSICAL THERAPY | Facility: REHABILITATION | Age: 32
End: 2022-05-12
Payer: COMMERCIAL

## 2022-05-17 ENCOUNTER — OFFICE VISIT (OUTPATIENT)
Dept: PHYSICAL THERAPY | Facility: REHABILITATION | Age: 32
End: 2022-05-17
Payer: COMMERCIAL

## 2022-05-17 DIAGNOSIS — N81.89 PELVIC FLOOR WEAKNESS: ICD-10-CM

## 2022-05-17 DIAGNOSIS — N39.46 MIXED STRESS AND URGE URINARY INCONTINENCE: Primary | ICD-10-CM

## 2022-05-17 DIAGNOSIS — M62.89 PELVIC FLOOR TENSION: ICD-10-CM

## 2022-05-17 PROCEDURE — 97140 MANUAL THERAPY 1/> REGIONS: CPT

## 2022-05-17 NOTE — PROGRESS NOTES
Daily Note     Today's date: 2022  Patient name: Elizabet Lancaster  : 1990  MRN: [id number]  Referring provider: Isabelle Rowan  Dx:   Encounter Diagnosis     ICD-10-CM    1  Mixed stress and urge urinary incontinence  N39 46    2  Pelvic floor tension  M62 89    3  Pelvic floor weakness  N81 89        Patient report that she has been having PFD since 2017  She first addressed it in 2019  She underwent urodynamics due to UUI and urinary urgency in 2019 and was found to be fine  She received PFPT x 2 months at [facility] with MT and she notes that she felt at though she hit a plateau       She lost her brother 1 year ago and she notes that the past year has been "terrible" related to stress levels     C/C: [place], the past 2 weeks with flatulence and coughing     Bladder incontinence is occurring daily whether stress or urge     From August - 2021, she received PFPT at [facility]      lives in [place] [facility] and she lives in [place] and 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)    Start Time: 1345  Stop Time: 1438  Total time in clinic (min): 53 minutes    Subjective: Pt reports having her gallbladder surgery on the , is overall healing well  Did not experience any urgency one day but then it returned  Is challenged at times when performing her HEP when exhaling and engaging  Objective: See treatment diary below      Assessment: Tolerated treatment well  Patient would benefit from continued PT  Gentle bladder mobility performed being mindful to work below the incision and only gentle  No discomfrt report with PFM release/cueing  R OI externally able to achieve good relief  Fascia decompression performed over R glute in s/l position  Held any abdominal work due to recent surgery  Plan: Continue per plan of care  Add some co-contractions when she can relax appropriately        Precautions: standard      Manuals 3/22 3/31 4/14 4/26 5/3 5/09       Gentle downtraining nv    10'        Left intercostal release b/w ribs 11-12  5' 5'          VM   40' 10'         C/R/RE   10'   5'       Breath ed + PFM (she's had previous PT)     13'        Bladder mobilization     x10' 10' 10'       Gentle PFM release     x20 20' 25' incl   periurethral & compressor urethra release       Bowel massage      10                                              Ther Ex                          Reformer:   Hip flexor strechx3 each   x3       PFM C/R with focus on relax in sitting    5x10 - fwd shift for anterior bias         Prone press ups     5' 5x w/ OP       Happy baby      2'                                              Ther Activity             education anatomy and POC x10'   Self stretches w/ dilator, breath focus x8'         Urge suppression  10'           Gait Training                                       Modalities             biofeedback  40'

## 2022-05-24 ENCOUNTER — OFFICE VISIT (OUTPATIENT)
Dept: PHYSICAL THERAPY | Facility: REHABILITATION | Age: 32
End: 2022-05-24
Payer: COMMERCIAL

## 2022-05-24 ENCOUNTER — OFFICE VISIT (OUTPATIENT)
Dept: SURGERY | Facility: CLINIC | Age: 32
End: 2022-05-24

## 2022-05-24 DIAGNOSIS — N39.46 MIXED STRESS AND URGE URINARY INCONTINENCE: Primary | ICD-10-CM

## 2022-05-24 DIAGNOSIS — Z48.89 POSTOPERATIVE VISIT: Primary | ICD-10-CM

## 2022-05-24 DIAGNOSIS — M62.89 PELVIC FLOOR TENSION: ICD-10-CM

## 2022-05-24 DIAGNOSIS — N81.89 PELVIC FLOOR WEAKNESS: ICD-10-CM

## 2022-05-24 PROCEDURE — 99024 POSTOP FOLLOW-UP VISIT: CPT | Performed by: SURGERY

## 2022-05-24 PROCEDURE — 97140 MANUAL THERAPY 1/> REGIONS: CPT | Performed by: PHYSICAL THERAPIST

## 2022-05-24 NOTE — PROGRESS NOTES
Daily Note     Today's date: 2022  Patient name: Peterson Dodd  : 1990  MRN: [id number]  Referring provider: Wing Mejia  Dx:   Encounter Diagnosis     ICD-10-CM    1  Mixed stress and urge urinary incontinence  N39 46    2  Pelvic floor tension  M62 89    3  Pelvic floor weakness  N81 89        Patient report that she has been having PFD since 2017  She first addressed it in 2019  She underwent urodynamics due to UUI and urinary urgency in 2019 and was found to be fine  She [facility] with MT and she notes that she felt at though she hit a plateau       She lost her brother 1 year ago and she notes that the past year has been "terrible" related to stress levels     C/C: UUI while walking to the , the past 2 weeks with flatulence and coughing     Bladder incontinence is occurring daily whether stress or urge     From August - 2021, she received PFPT at [facility]      lives in [place] and she lives in [place] and attends [address] [place] [address] (doctorate in athletic training)               Subjective: Pt reports that she is doing well since gall bladder surgery  She feels as though her bladder urgency is a "little better" and she is mindful of paying attention to whether she truly has an urge  She reports that has been trying to eat more regularly  Insurance coverage currently thorugh the end of  so she would like to optimize her PT visit and learn so strategies she can be doing herself  She will see her surgeon today post gall bladder and she will likely be cleared for more exercise, particularly abdominal, when she returns to us next week  Objective: See treatment diary below      Assessment: Tolerated treatment well  Patient would benefit from continued PT  VM and internal release well tolerated this session  Most tension is along the L lateral IC and OI region  Good softening with manual techniques   She will benefit from some manual therapy to encourage eccentric lengthening (reverse kegels) next session followed by reformer work in quadruped and with straps for deep core engagement  (Patient also requesting some things she can do at home next session)  Plan: Continue per plan of care  Add some co-contractions when she can relax appropriately  Precautions: standard      Manuals 3/22 3/31 4/14 4/26 5/3 5/09 5/24      Gentle downtraining nv    10'        Left intercostal release b/w ribs 11-12  5' 5'          VM   40' 10'   20'      C/R/RE   10'   5'       Breath ed + PFM (she's had previous PT)     13'        Bladder mobilization     x10' 10' 10' 10'      Gentle PFM release     x20 20' 25' incl   periurethral & compressor urethra release 25'      Bowel massage      10                                              Ther Ex                          Reformer:   Hip flexor strechx3 each   x3       PFM C/R with focus on relax in sitting    5x10 - fwd shift for anterior bias         Prone press ups     5' 5x w/ OP       Happy baby      2'                                              Ther Activity             education anatomy and POC x10'   Self stretches w/ dilator, breath focus x8'         Urge suppression  10'           Gait Training                                       Modalities             biofeedback  40'

## 2022-05-24 NOTE — PROGRESS NOTES
Assessment/Plan:  Patient recently underwent laparoscopic cholecystectomy for biliary sludge  She returns today for follow-up visit  She relates a history for right upper quadrant discomfort associated with fried food  This discomfort dissipated over the course of 3 days  At this time she feels well  She denies abdominal pain  Denies fever chills  Examination reveals her incisions are healing well  There is no evidence for infection  Abdomen is soft  We discussed advancing her activities and diet  All questions were answered  There are no diagnoses linked to this encounter  Postoperative restrictions reviewed  All questions answered  ______________________________________________________  HPI: Patient presents post operatively  Laparoscopic cholecystectomy 5/10/2022  Final Diagnosis  A  Gallbladder, cholecystectomy:    -  Chronic cholecystitis with cholesterolosis  -  Gallstones are not identified in the submitted materials                Patient Active Problem List   Diagnosis    Abscess of right groin    Gastric hypertonus    Lymphadenopathy, inguinal    Family history of colon cancer    Irritable esophagus    Rectal bleeding       Allergies:  Cefaclor      Current Outpatient Medications:     Ascorbic Acid (VITAMIN C PO), Take by mouth in the morning  , Disp: , Rfl:     betamethasone valerate (LUXIQ) 0 12 % foam, if needed  , Disp: , Rfl:     Calcium Carb-Cholecalciferol (CALCIUM+D3 PO), Take by mouth in the morning  , Disp: , Rfl:     famotidine (PEPCID) 20 mg tablet, Take 1 tablet (20 mg total) by mouth 2 (two) times a day (Patient taking differently: Take 20 mg by mouth if needed  ), Disp: 120 tablet, Rfl: 1    ISOtretinoin (ACCUTANE) 20 MG capsule, Take 20 mg by mouth every other day, Disp: , Rfl:     lisdexamfetamine (VYVANSE) 30 MG capsule, Take 30 mg by mouth every morning, Disp: , Rfl:     nortriptyline (PAMELOR) 10 mg capsule, Take 1 capsule (10 mg total) by mouth daily at bedtime, Disp: 90 capsule, Rfl: 3    Omega-3 Fatty Acids (SV FISH OIL PO), Take by mouth in the morning  , Disp: , Rfl:     Probiotic Product (PROBIOTIC-10 PO), Take by mouth in the morning  , Disp: , Rfl:     Past Medical History:   Diagnosis Date    ADD (attention deficit disorder)     Anxiety     Asthma     seasonal, with URI, and cold weather excercise    Colon polyp     Epigastric pain     occasional nausea    Esophageal pain     occasional    Gastric hypertonus     GERD (gastroesophageal reflux disease)     occasional    Lymphadenopathy, inguinal     PONV (postoperative nausea and vomiting)     Raynaud's disease     Rectal bleeding        Past Surgical History:   Procedure Laterality Date    BUNIONECTOMY      COLONOSCOPY      CT LAP,CHOLECYSTECTOMY N/A 5/10/2022    Procedure: CHOLECYSTECTOMY LAPAROSCOPIC (possibly open); Surgeon: Obdulia Spencer DO;  [facility];  Service: General    SALIVARY GLAND SURGERY      SUPERFICIAL LYMPH NODE BIOPSY / EXCISION Right     Right groin    UPPER GASTROINTESTINAL ENDOSCOPY         Family History   Problem Relation Age of Onset    Colon polyps Mother     No Known Problems Father     Colon cancer Maternal Grandfather         reports that she has been smoking cigars  She has never used smokeless tobacco  She reports current alcohol use of about 2 0 standard drinks of alcohol per week  She reports current drug use  Drug: Marijuana  PHYSICAL EXAM    There were no vitals taken for this visit      General: normal, cooperative, no distress  Abdominal: soft, nondistended or nontender  Incision: clean, dry, and intact and healing well      Sean Valenzuela MD    Date: 5/24/2022 Time: 12:36 PM

## 2022-06-01 ENCOUNTER — OFFICE VISIT (OUTPATIENT)
Dept: PHYSICAL THERAPY | Facility: REHABILITATION | Age: 32
End: 2022-06-01
Payer: COMMERCIAL

## 2022-06-01 DIAGNOSIS — M62.89 PELVIC FLOOR TENSION: ICD-10-CM

## 2022-06-01 DIAGNOSIS — N39.46 MIXED STRESS AND URGE URINARY INCONTINENCE: Primary | ICD-10-CM

## 2022-06-01 DIAGNOSIS — N81.89 PELVIC FLOOR WEAKNESS: ICD-10-CM

## 2022-06-01 PROCEDURE — 97110 THERAPEUTIC EXERCISES: CPT

## 2022-06-01 PROCEDURE — 97140 MANUAL THERAPY 1/> REGIONS: CPT

## 2022-06-01 NOTE — PROGRESS NOTES
Daily Note     Today's date: 2022  Patient name: Wilfredo William  : 1990  MRN: [id number]  Referring provider: Louis Bedolla  Dx:   Encounter Diagnosis     ICD-10-CM    1  Mixed stress and urge urinary incontinence  N39 46    2  Pelvic floor tension  M62 89    3  Pelvic floor weakness  N81 89        Patient report that she has been having PFD since 2017  She first addressed it in 2019  She underwent urodynamics due to UUI and urinary urgency in 2019 and was found to be fine  She received PFPT x 2 months at [facility] with MT and she notes that she felt at though she hit a plateau       She lost her brother 1 year ago and she notes that the past year has been "terrible" related to stress levels     C/C: U[place], the past 2 weeks with flatulence and coughing     Bladder incontinence is occurring daily whether stress or urge     From August - 2021, she [facility]      lives in [place] and she lives in [place] and 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)    Start Time: 1245  Stop Time: 1340  Total time in clinic (min): 55 minutes    Subjective: Pt feels her bowels are at times quicker since having gallbladder removed  Pt has been working on her piriformis, feels they are restricted  Objective: See treatment diary below      Assessment: Tolerated treatment well  Patient would benefit from continued PT  Good tolerance to PFM stretching and cueing  Transitioned to reformer with standing exercises and supine  Held off on quadriped due to surgeon's recommendation  Pt challenged to properly activate her PFM but appears to have good awareness  Plan: Continue per plan of care        Precautions: standard      Manuals 3/22 3/31 4/14 4/26 5/3 5/09 5/24 6/1     Gentle downtraining nv    10'        Left intercostal release b/w ribs 11-12  5' 5'          VM   40' 10'   20'      C/R/RE   10'   5' Breath ed + PFM (she's had previous PT)     15'        Bladder mobilization     x10' 10' 10' 10'      Gentle PFM release     x20 20' 25' incl   periurethral & compressor urethra release 25' 23' + cueing     Bowel massage      10                                              Ther Ex                          Reformer:   Hip flexor strechx3 each   x3  x3     Reformer standing        1B reverse and side lunges     Reformer supine        1B1W heels together, heels at th end f thebar, adductors, bridges, bridges with leg raise      PFM C/R with focus on relax in sitting    5x10 - fwd shift for anterior bias         Prone press ups     5' 5x w/ OP       Happy baby      2'                                              Ther Activity             education anatomy and POC x10'   Self stretches w/ dilator, breath focus x8'         Urge suppression  10'           Gait Training                                       Modalities             biofeedback  40'

## 2022-06-09 ENCOUNTER — OFFICE VISIT (OUTPATIENT)
Dept: SURGERY | Facility: CLINIC | Age: 32
End: 2022-06-09

## 2022-06-09 ENCOUNTER — OFFICE VISIT (OUTPATIENT)
Dept: PHYSICAL THERAPY | Facility: REHABILITATION | Age: 32
End: 2022-06-09
Payer: COMMERCIAL

## 2022-06-09 DIAGNOSIS — M62.89 PELVIC FLOOR TENSION: ICD-10-CM

## 2022-06-09 DIAGNOSIS — N81.89 PELVIC FLOOR WEAKNESS: ICD-10-CM

## 2022-06-09 DIAGNOSIS — K80.20 CALCULUS OF GALLBLADDER WITHOUT CHOLECYSTITIS WITHOUT OBSTRUCTION: Primary | ICD-10-CM

## 2022-06-09 DIAGNOSIS — N39.46 MIXED STRESS AND URGE URINARY INCONTINENCE: Primary | ICD-10-CM

## 2022-06-09 PROCEDURE — 99024 POSTOP FOLLOW-UP VISIT: CPT | Performed by: SURGERY

## 2022-06-09 PROCEDURE — 97112 NEUROMUSCULAR REEDUCATION: CPT

## 2022-06-09 PROCEDURE — 97140 MANUAL THERAPY 1/> REGIONS: CPT

## 2022-06-09 NOTE — PROGRESS NOTES
Assessment/Plan:    Diagnoses and all orders for this visit:    Calculus of gallbladder without cholecystitis without obstruction    Overall doing well status post laparoscopic cholecystectomy  Likely has a suture reaction of the right lateral trocar site  There is no signs of infection  May watch simply with soap and water  May apply hand lotion to soften the area  Pathology: Reviewed with patient, all questions answered  Postoperative restrictions reviewed  All questions answered  ______________________________________________________  HPI: Patient presents for follow up  S/P laparoscopic cholecystectomy  5/10/2022  The last few days noticed some increasing erythema over the right lateral trocar site  She would washing it with soap and water and keeping it covered  ROS:  General ROS: negative for - chills, fatigue, fever or night sweats, weight loss  Respiratory ROS: no cough, shortness of breath, or wheezing  Cardiovascular ROS: no chest pain or dyspnea on exertion  Genito-Urinary ROS: no dysuria, trouble voiding, or hematuria  Musculoskeletal ROS: negative for - gait disturbance, joint pain or muscle pain  Neurological ROS: no TIA or stroke symptoms  GI ROS: see HPI  Skin ROS: no new rashes or lesions   Lymphatic ROS: no new adenopathy noted by pt     GYN ROS: see HPI, no new GYN history or bleeding noted  Psy ROS: no new mental or behavioral disturbances         Patient Active Problem List   Diagnosis    Abscess of right groin    Gastric hypertonus    Lymphadenopathy, inguinal    Family history of colon cancer    Irritable esophagus    Rectal bleeding    Postoperative visit       Allergies:  Cefaclor      Current Outpatient Medications:     Ascorbic Acid (VITAMIN C PO), Take by mouth in the morning  , Disp: , Rfl:     betamethasone valerate (LUXIQ) 0 12 % foam, if needed  , Disp: , Rfl:     Calcium Carb-Cholecalciferol (CALCIUM+D3 PO), Take by mouth in the morning , Disp: , Rfl:     famotidine (PEPCID) 20 mg tablet, Take 1 tablet (20 mg total) by mouth 2 (two) times a day (Patient taking differently: Take 20 mg by mouth if needed  ), Disp: 120 tablet, Rfl: 1    ISOtretinoin (ACCUTANE) 20 MG capsule, Take 20 mg by mouth every other day, Disp: , Rfl:     lisdexamfetamine (VYVANSE) 30 MG capsule, Take 30 mg by mouth every morning, Disp: , Rfl:     nortriptyline (PAMELOR) 10 mg capsule, Take 1 capsule (10 mg total) by mouth daily at bedtime, Disp: 90 capsule, Rfl: 3    Omega-3 Fatty Acids (SV FISH OIL PO), Take by mouth in the morning  , Disp: , Rfl:     Probiotic Product (PROBIOTIC-10 PO), Take by mouth in the morning  , Disp: , Rfl:     Past Medical History:   Diagnosis Date    ADD (attention deficit disorder)     Anxiety     Asthma     seasonal, with URI, and cold weather excercise    Colon polyp     Epigastric pain     occasional nausea    Esophageal pain     occasional    Gastric hypertonus     GERD (gastroesophageal reflux disease)     occasional    Lymphadenopathy, inguinal     PONV (postoperative nausea and vomiting)     Raynaud's disease     Rectal bleeding        Past Surgical History:   Procedure Laterality Date    BUNIONECTOMY      COLONOSCOPY      [place] LAP,CHOLECYSTECTOMY N/A 5/10/2022    Procedure: CHOLECYSTECTOMY LAPAROSCOPIC (possibly open); Surgeon: Eli Britton DO;  [facility];  Service: General    SALIVARY GLAND SURGERY      SUPERFICIAL LYMPH NODE BIOPSY / EXCISION Right     Right groin    UPPER GASTROINTESTINAL ENDOSCOPY         Family History   Problem Relation Age of Onset    Colon polyps Mother     No Known Problems Father     Colon cancer Maternal Grandfather         reports that she has been smoking cigars  She has never used smokeless tobacco  She reports current alcohol use of about 2 0 standard drinks of alcohol per week  She reports current drug use  Drug: Marijuana      PHYSICAL EXAM    There were no vitals taken for this visit  General: normal, cooperative, no distress  Abdominal: soft, nondistended or nontender  Incision: clean, dry, and intact and healing well  Right lateral trocar site likely has a suture reaction  I could not see any stitch being extruded  No underlying fluctuance        Nik Pearl DO    Date: 6/9/2022 Time: 10:57 AM

## 2022-06-09 NOTE — PROGRESS NOTES
Daily Note     Today's date: 2022  Patient name: Pauline Barlow  : 1990  MRN: [id number]  Referring provider: Radha Avalos  Dx:   Encounter Diagnosis     ICD-10-CM    1  Mixed stress and urge urinary incontinence  N39 46    2  Pelvic floor tension  M62 89    3  Pelvic floor weakness  N81 89        Patient report that she has been having PFD since 2017  She first addressed it in 2019  She underwent urodynamics due to UUI and urinary urgency in 2019 and was found to be fine  [facility] and she notes that she felt at though she hit a plateau       She lost her brother 1 year ago and she notes that the past year has been "terrible" related to stress levels     C/C: [place], the past 2 weeks with flatulence and coughing     Bladder incontinence is occurring daily whether stress or urge     From August - 2021, she received PFPT at [facility]      lives in [place] [facility] [place] and she lives in [place] and 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)    Start Time: 1420  Stop Time: 1530  Total time in clinic (min): 70 minutes    Subjective: Pt saw her doctor this morning  He cleared off for core exercises and STM  Objective: See treatment diary below    Surface EMG biofeedback was used to decrease rest tone and was performed in hooklying, supine and sitting position position  Patient presents with 3 0uV resting activity level at baseline   Set#1: 5 sec active PFM contractions followed by 10 sec of rest for 10 repetitions  Average muscle activity during work phase measured 37 5uV, with the goal being > 12 0uV; average muscle activity during rest phase measured 4 5 uV with the goal being < 2 5uV   Set#2: supine Performed 5 sec active PFM contractions followed by 10sec of rest for 10 repetitions  Average muscle activity during work phase measured 18  0uV, with the goal being > 12 0uV; average muscle activity during rest phase measured 1  1uV with the goal being < 2 5uV   Set#3: Performed sitting 5 sec active PFM contractions followed by 10 sec of rest for 10 repetitions  Average muscle activity during work phase measured 20  0uV, with the goal being > 12 0uV; average muscle activity during rest phase measured 3  1uV with the goal being < 2 5uV  Assessment: Tolerated treatment well  Patient would benefit from continued PT  Francisco Collins looked over scars, encouraged to start STM on 2 incisions  She may benefit from holding off one more week for the incision which is red  Pt benefits from cueing to improve breathing pattern while on reformer  Able to achieve good resting tone in hooklying and sitting positions but challenged to maintain  In supine position with feet in a dorsiflex position, pt was able to achieve complete relaxation and could maintain it  Plan: Continue per plan of care  Precautions: standard      Manuals 3/22 3/31 4/14 4/26 5/3 5/09 5/24 6/1 6/9    Gentle downtraining nv    10'        Left intercostal release b/w ribs 11-12  5' 5'          VM   40' 10'   20'      C/R/RE   10'   5'       Breath ed + PFM (she's had previous PT)     13'        Bladder mobilization     x10' 10' 10' 10'      Gentle PFM release     x20 20' 25' incl   periurethral & compressor urethra release 25' 23' + cueing     Bowel massage      10       MET & piriformis         15'                              Ther Ex             Neuro             biofeedack         20' h    Reformer:   Hip flexor strechx3 each   x3  x3     Reformer standing        1B reverse and side lunges 1B reverse and side lunges    Reformer supine        1B1W heels together, heels at th end f thebar, adductors, bridges, bridges with leg raise  1B1W heels together, heels at th end f thebar, adductors, bridges, bridges with leg raise     Reformer quadriped         1B [facility]    Reformer 90 position         1B frog to straight leg x 10    Reformer         1B adductors/signle adductor "V"     PFM C/R with focus on relax in sitting    5x10 - fwd shift for anterior bias         Prone press ups     5' 5x w/ OP       Happy baby      2'                                              Ther Activity             education anatomy and POC x10'   Self stretches w/ dilator, breath focus x8'         Urge suppression  10'           Gait Training                                       Modalities             biofeedback  40'

## 2022-06-14 ENCOUNTER — OFFICE VISIT (OUTPATIENT)
Dept: PHYSICAL THERAPY | Facility: REHABILITATION | Age: 32
End: 2022-06-14
Payer: COMMERCIAL

## 2022-06-14 DIAGNOSIS — N39.46 MIXED STRESS AND URGE URINARY INCONTINENCE: Primary | ICD-10-CM

## 2022-06-14 DIAGNOSIS — N81.89 PELVIC FLOOR WEAKNESS: ICD-10-CM

## 2022-06-14 DIAGNOSIS — M62.89 PELVIC FLOOR TENSION: ICD-10-CM

## 2022-06-14 PROCEDURE — 97112 NEUROMUSCULAR REEDUCATION: CPT

## 2022-06-14 PROCEDURE — 97140 MANUAL THERAPY 1/> REGIONS: CPT

## 2022-06-14 NOTE — PROGRESS NOTES
Daily Note     Today's date: 2022  Patient name: Elizabet Lancaster  : 1990  MRN: [id number]  Referring provider: Isabelle Rowan  Dx:   Encounter Diagnosis     ICD-10-CM    1  Mixed stress and urge urinary incontinence  N39 46    2  Pelvic floor tension  M62 89    3  Pelvic floor weakness  N81 89        Patient report that she has been having PFD since 2017  She first addressed it in 2019  She underwent urodynamics due to UUI and urinary urgency in 2019 and was found to be fine  She received PFPT x 2 months at [facility] with MT and she notes that she felt at though she hit a plateau       She lost her brother 1 year ago and she notes that the past year has been "terrible" related to stress levels     C/C: UUI while walking to the , the past 2 weeks with flatulence and coughing     Bladder incontinence is occurring daily whether stress or urge     From August - 2021, she received [facility] at [facility]      lives in [place] and she lives in [place] and 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)    Start Time: 1105  Stop Time: 1155  Total time in clinic (min): 50 minutes    Subjective: Pt reports being discouraged in the last few days, she has experienced 2 episodes of uncontrollable urine leakage  Second time occurred just before today's session when she got to the parking lot  It was preceeded by some urgency symptoms, but then every time she tried to get out of the car she would leak more  Pt is unsure what is causing this, wonders if her diazepam medication whish she still uses is making her muscles too relax now  Objective: See treatment diary below    Assessment: Tolerated treatment well  Patient would benefit from continued PT  Discussed with Víctor Watson she may benefit from discussing with the urologist who prescribed the medication how to ween herself off    Minimal discomfort reproduced with PFM stretching and appears to be able to contract and relax well  Plan: Continue per plan of care  Precautions: standard      Manuals 3/22 3/31 4/14 4/26 5/3 5/09 5/24 6/1 6/9 6/14   Gentle downtraining nv    10'        Left intercostal release b/w ribs 11-12  5' 5'          VM   40' 10'   20'      C/R/RE   10'   5'       Breath ed + PFM (she's had previous PT)     13'        Bladder mobilization     x10' 10' 10' 10'      Gentle PFM release     x20 20' 25' incl   periurethral & compressor urethra release 25' 23' + cueing  15'   Bowel massage      10       MET & piriformis         15'                              Ther Ex             Neuro             biofeedack         20' h    Reformer:   Hip flexor strechx3 each   x3  x3     Reformer standing        1B reverse and side lunges 1B reverse and side lunges 1B reverse and side lunges   Reformer supine        1B1W heels together, heels at th end f thebar, adductors, bridges, bridges with leg raise  1B1W heels together, heels at th end f thebar, adductors, bridges, bridges with leg raise  1B1W heels together, heels at th end f thebar, adductors, bridges, bridges with leg raise    Reformer quadriped         1B wheelbarrow 1B wheelbarrow   Reformer 90 position         1B frog to straight leg x 10 1B frog to straight leg x 10   Reformer         1B adductors/signle adductor "V"  1B adductors/signle adductor "V"    PFM C/R with focus on relax in sitting    5x10 - fwd shift for anterior bias         Prone press ups     5' 5x w/ OP       Happy baby      2'                                              Ther Activity             education anatomy and POC x10'   Self stretches w/ dilator, breath focus x8'         Urge suppression  10'           Gait Training                                       Modalities             biofeedback  40'

## 2022-06-16 ENCOUNTER — TELEPHONE (OUTPATIENT)
Dept: UROLOGY | Facility: CLINIC | Age: 32
End: 2022-06-16

## 2022-06-16 NOTE — TELEPHONE ENCOUNTER
Patient being referred to Gino Beltran by [facility] PT for urge incontinence  Patient states she is currently taking Ditropan  She was seen by [address] [place] [address] Urology in the past ( records in [facility] )  Please advise

## 2022-06-16 NOTE — TELEPHONE ENCOUNTER
Spoke with patient  She is transitioning from [address] to another position and her health insurance will be up on 6/30  Scheduled in [place] at patient request  Confirmed location with patient

## 2022-06-20 NOTE — PROGRESS NOTES
PT Re-Evaluation     Today's date: 2022  Patient name: Demetrio Hill  : 1990  MRN: [id number]  Referring provider: Devin Mcmillan  Dx:   Encounter Diagnosis     ICD-10-CM    1  Mixed stress and urge urinary incontinence  N39 46    2  Pelvic floor tension  M62 89    3  Pelvic floor weakness  N81 89                   Assessment  Assessment details: Sis continues with UUI>LOIS symptoms which have improved a modicum since [facility] but are still persistent and bothersome  She had adhered to physical therapy visits and has been compliant with her HEP  She would benefit from continued downtraining as well as strengthening exercises  We have also recommended another urology consultation given her age and duration of symptoms  She is amenable to continued treatment given progress overall  Impairments: abnormal muscle tone, activity intolerance and impaired physical strength  Understanding of Dx/Px/POC: good   Prognosis: good    Goals  STGs to be met in 4 weeks:  * Patient will be compliant with introductory HEP as prescribed  MET  * Patient presents with good understanding of pelvic floor protective strategies to reduce intra-abdominal pressure against pelvic organs  MET  LTGs to be met by discharge:  * Patient will present with a  60% improvement on FOTO score by discharge to indicate improved symptom management  NOT MET  * Normalize sEMG findings to indicate strength average > 12uV and resting average < 2 0uV  NA  * Demonstrates correct isolation and relaxation of pelvic floor to palpation without overflow from global stabilizers  MET  * Reports that she/he can cough/laugh/sneeze with at least 90% less bladder leakage  NOT MET  * Presents with improved nocturia to 0 toiletings/night for more thorough sleep  NOT %  * Patient will use pelvic floor muscles correctly during functional ADLs such as coughing, sneezing, lifting and exercise activities to avoid excessive IAP and PFM strain  ONGOING  * Patient will be compliant with comprehensive home exercise program for self management of condition  ONGOING    Plan  Patient would benefit from: skilled physical therapy  Referral necessary: No  Planned therapy interventions: activity modification, neuromuscular re-education, patient education, strengthening, therapeutic activities, therapeutic exercise and home exercise program  Frequency: 1x week  Duration in weeks: 12  Plan of Care beginning date: 6/21/2022  Plan of Care expiration date: 9/13/2022  Treatment plan discussed with: patient        PT Pelvic Floor Subjective:   History of Present Illness:   Patient reports LOIS with sit to stand transition  Happens most of the time  Patient report that she has been having PFD since 2017  She first addressed it in 2019  She underwent urodynamics due to UUI and urinary urgency in 2019 and was found to be fine  She [facility] with [facility] and she notes that she felt at though she hit a plateau       She lost her brother 1 year ago and she notes that the past year has been "terrible" related to stress levels    C/C: UUI while walking to the [place], the past 2 weeks with flatulence and coughing     Bladder incontinence is occurring daily whether stress or urge    From August - December 2021, she received [facility] at [facility]     lives in [place] and she lives in [place] and 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 in athletic training)        Recurrent probe    Quality of life: good    Social Support:     Relationship status: /committed    Work status: employed part time ([address])    Life stress level: 9  Hand dominance:  Right  Diet and Exercise:    Diet:balanced nutrition    Pilates and power yoga - but not currently due to busy schedule  Co-morbidities:    Lymphendectomy on R side - 2 nodes removed due to swelling in groin  R hip labral tear - + MRI, "gives way," pinches especially when her SIJ is problematic  OB/ gyn History    Gestational History:     Prior Pregnancy: No      Menstrual History:    Date of last menstrual period: 3/13/2022    Menstrual irregularities regular menses    Painful periods:  No difficulty managing menstrual pain    Tolerates tampons: just switched     Menopausal: no menopause    Birth control method: IUD (paraguard)  Uses menstrual disc for period - notices watery discharge with blood during cycle x 2 months   Bladder Function:     Voiding Difficulties positive for: incomplete emptying      Voiding Difficulties comments:     Voiding frequency: every 1-2 hours    Urinary leakage aggravated by: coughing, sneezing, walking to the bathroom, key-in-the-door syndrome and anxiety    Nocturia (episodes per night): 1 and 0    Intake (ounces): Water intake (oz): 64 ounces  Juice intake (oz): occ  Coffee intake (oz): once a day  Alcohol intake (oz): social     Intake (ounces) comment: Uses toilet seat covers   Feels dehydrated throughout the day  Bowel Function:     Voiding DIfficulties: urgency      Bowel Function comments:  IBS-C&D -see GI due to h/o colon cancer in family  Takes fiber supplement   She had a cholecystectomy on 6 weeks ago     Bowel frequency: daily    [place] Stool Scale: type 5 and type 4  Sexual Function:     Sexually Active:  Non-contributory  Pain:     Current pain ratin    At best pain ratin    At worst pain ratin    Location:  Vaginal during insertion  Diagnostic Tests:     None    Treatments:     Previous treatment:  Physical therapyno  Patient Goals:     Patient goals for therapy:  Improved quality of life, fully empty bladder or bowels and improved bladder or bowel function      Objective   Pelvic Floor Exam     General Perineum Exam:   Positive for swelling       General perineum exam comments: No discharge, irritation, organ prolapse or skin breakdown evident      Visual Inspection of Perineum:   Excursion of perineal body in cephalad direction with contraction of pelvic floor muscles (PFM): fair   Excursion of perineal body in caudal direction with relaxation of pelvic floor muscles (PFM): fair   Involuntary contraction with coughing: yes  Involuntary relaxation with bearing down: yes  Cotton swab test: non-tender  Cough reflex: cough reflex  Sphincter Tone Resting: normal  Sphincter Tone Squeeze: normal  Sensation: intact    Pelvic Organ Prolapse   no pelvic organ prolapse    Pelvic Floor Muscle Exam     Palpation   Minimal increased muscle tension in the super transverse perineal, coccygeus and obturator internus Comment: ~75% decreased active muscle engagement  No tenderness on right in the super transverse perineal  Minimal tenderness on right in the coccygeus and obturator internus Comment: ~ 25% decreased active muscle contraction  Minimal tenderness on left in the super transverse perineal, coccygeus and obturator internus  Moderate tenderness on left in the coccygeus and obturator internus    Muscle Contraction: well isolated  Breathing pattern with contraction: within normal limits  Pelvic floor muscle relaxation is incomplete  25% pelvic floor relaxation    PERFECT Score   Power right: 3/5  Power left: 3/5  Endurance (seconds to max): 6  Repetitions (before fatigue): 6    SMEG Biofeedback   to be assessed next treatment               Precautions: standard    Pt reports being discouraged in the last few days, she has experienced 2 episodes of uncontrollable urine leakage  Second time occurred just before today's session when she got to the parking lot  It was preceeded by some urgency symptoms, but then every time she tried to get out of the car she would leak more  Pt is unsure what is causing this, wonders if her diazepam medication whish she still uses is making her muscles too relax now  Objective: See treatment diary below    Assessment: Tolerated treatment well  Patient would benefit from continued PT   Discussed with Julian Dover Steimling she may benefit from discussing with the urologist who prescribed the medication how to ween herself off  Minimal discomfort reproduced with PFM stretching and appears to be able to contract and relax well  Plan: Continue per plan of care  Precautions: standard      Manuals 3/22 3/31 4/14 4/26 5/3 5/09 5/24 6/1 6/9 6/14 6/21    Gentle downtraining nv    10'      10'    Left intercostal release b/w ribs 11-12  5' 5'            VM   40' 10'   20'        C/R/RE   10'   5'         Breath ed + PFM (she's had previous PT)     13'          Bladder mobilization     x10' 10' 10' 10'        Gentle PFM release     x20 20' 25' incl   periurethral & compressor urethra release 25' 23' + cueing  15' 20'    Bowel massage      10         MET & piriformis         15'      REEVAL           15'                   Ther Ex               Neuro               biofeedack         20' h      Reformer:   Hip flexor strechx3 each   x3  x3       Reformer standing        1B reverse and side lunges 1B reverse and side lunges 1B reverse and side lunges 1B reverse and side lunges    Reformer supine        1B1W heels together, heels at th end f thebar, adductors, bridges, bridges with leg raise  1B1W heels together, heels at th end f thebar, adductors, bridges, bridges with leg raise  1B1W heels together, heels at th end f thebar, adductors, bridges, bridges with leg raise  1B1W heels together, heels at th end f thebar, adductors, bridges, bridges with leg raise     Reformer quadriped         1B wheelbarrow 1B wheelbarrow     Reformer 90 position         1B frog to straight leg x 10 1B frog to straight leg x 10     Reformer         1B adductors/signle adductor "V"  1B adductors/signle adductor "V"      PFM C/R with focus on relax in sitting    5x10 - fwd shift for anterior bias           Prone press ups     5' 5x w/ OP         Happy baby      2'                                                      Ther Activity education anatomy and POC x10'   Self stretches w/ dilator, breath focus x8'           Urge suppression  10'             Gait Training                                             Modalities               biofeedback  40'

## 2022-06-21 ENCOUNTER — EVALUATION (OUTPATIENT)
Dept: PHYSICAL THERAPY | Facility: REHABILITATION | Age: 32
End: 2022-06-21
Payer: COMMERCIAL

## 2022-06-21 DIAGNOSIS — N39.46 MIXED STRESS AND URGE URINARY INCONTINENCE: Primary | ICD-10-CM

## 2022-06-21 DIAGNOSIS — N81.89 PELVIC FLOOR WEAKNESS: ICD-10-CM

## 2022-06-21 DIAGNOSIS — M62.89 PELVIC FLOOR TENSION: ICD-10-CM

## 2022-06-21 PROCEDURE — 97110 THERAPEUTIC EXERCISES: CPT | Performed by: PHYSICAL THERAPIST

## 2022-06-21 PROCEDURE — 97140 MANUAL THERAPY 1/> REGIONS: CPT | Performed by: PHYSICAL THERAPIST

## 2022-06-21 PROCEDURE — 97164 PT RE-EVAL EST PLAN CARE: CPT | Performed by: PHYSICAL THERAPIST

## 2022-06-21 NOTE — LETTER
2022    [address]    Patient: Samantha Rao   YOB: 1990   Date of Visit: 2022     Encounter Diagnosis     ICD-10-CM    1  Mixed stress and urge urinary incontinence  N39 46    2  Pelvic floor tension  M62 89    3  Pelvic floor weakness  N81 89        Dear Dr Merle Pickens:    Thank you for your recent referral of Samantha Rao  Please review the attached evaluation summary from Sis's recent visit  Please verify that you agree with the plan of care by signing the attached order  If you have any questions or concerns, please do not hesitate to call  I sincerely appreciate the opportunity to share in the care of one of your patients and hope to have another opportunity to work with you in the near future  Sincerely,    Ginna Kuhn, PT      Referring Provider:      I certify that I have read the below Plan of Care and certify the need for these services furnished under this plan of treatment while under my care  [address] [place] [id number]  Via Fax: [phone]          PT Re-Evaluation     Today's date: 2022  Patient name: Samantha aRo  : 1990  MRN: [id number]  Referring provider: Janee Bonds  Dx:   Encounter Diagnosis     ICD-10-CM    1  Mixed stress and urge urinary incontinence  N39 46    2  Pelvic floor tension  M62 89    3  Pelvic floor weakness  N81 89                   Assessment  Assessment details: Sis continues with UUI>LOIS symptoms which have improved a modicum since [facility] but are still persistent and bothersome  She had adhered to physical therapy visits and has been compliant with her HEP  She would benefit from continued downtraining as well as strengthening exercises  We have also recommended another urology consultation given her age and duration of symptoms   She is amenable to continued treatment given progress overall  Impairments: abnormal muscle tone, activity intolerance and impaired physical strength  Understanding of Dx/Px/POC: good   Prognosis: good    Goals  STGs to be met in 4 weeks:  * Patient will be compliant with introductory HEP as prescribed  MET  * Patient presents with good understanding of pelvic floor protective strategies to reduce intra-abdominal pressure against pelvic organs  MET  LTGs to be met by discharge:  * Patient will present with a  60% improvement on FOTO score by discharge to indicate improved symptom management  NOT MET  * Normalize sEMG findings to indicate strength average > 12uV and resting average < 2 0uV  NA  * Demonstrates correct isolation and relaxation of pelvic floor to palpation without overflow from global stabilizers  MET  * Reports that she/he can cough/laugh/sneeze with at least 90% less bladder leakage  NOT MET  * Presents with improved nocturia to 0 toiletings/night for more thorough sleep  NOT %  * Patient will use pelvic floor muscles correctly during functional ADLs such as coughing, sneezing, lifting and exercise activities to avoid excessive IAP and PFM strain  ONGOING  * Patient will be compliant with comprehensive home exercise program for self management of condition  ONGOING    Plan  Patient would benefit from: skilled physical therapy  Referral necessary: No  Planned therapy interventions: activity modification, neuromuscular re-education, patient education, strengthening, therapeutic activities, therapeutic exercise and home exercise program  Frequency: 1x week  Duration in weeks: 12  Plan of Care beginning date: 6/21/2022  Plan of Care expiration date: 9/13/2022  Treatment plan discussed with: patient        PT Pelvic Floor Subjective:   History of Present Illness:   Patient reports LOIS with sit to stand transition  Happens most of the time  Patient report that she has been having PFD since 2017  She first addressed it in 2019   She underwent urodynamics due to UUI and urinary urgency in 2019 and was found to be fine  She received PFPT x 2 months at [facility] with MT and she notes that she felt at though she hit a plateau  She lost her brother 1 year ago and she notes that the past year has been "terrible" related to stress levels    C/C: U[place], the past 2 weeks with flatulence and coughing     Bladder incontinence is occurring daily whether stress or urge    From August - December 2021, she received [facility]PT at [facility]     lives in [place] and she lives in [place] and 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)        Recurrent probem    Quality of life: good    Social Support:     Relationship status: /committed    Work status: employed part time ([address])    Life stress level: 9  Hand dominance:  Right  Diet and Exercise:    Diet:balanced nutrition    Pilates and power yoga - but not currently due to busy schedule  Co-morbidities:    Lymphendectomy on R side - 2 nodes removed due to swelling in groin  R hip labral tear - + MRI, "gives way," pinches especially when her SIJ is problematic  OB/ gyn History    Gestational History:     Prior Pregnancy: No      Menstrual History:    Date of last menstrual period: 3/13/2022    Menstrual irregularities regular menses    Painful periods:  No difficulty managing menstrual pain    Tolerates tampons: just switched     Menopausal: no menopause    Birth control method: IUD (paraguard)  Uses menstrual disc for period - notices watery discharge with blood during cycle x 2 months   Bladder Function:     Voiding Difficulties positive for: incomplete emptying      Voiding Difficulties comments:     Voiding frequency: every 1-2 hours    Urinary leakage aggravated by: coughing, sneezing, walking to the bathroom, key-in-the-door syndrome and anxiety    Nocturia (episodes per night): 1 and 0    Intake (ounces):  Water intake (oz): 64 ounces  Juice intake (oz): occ  Coffee intake (oz): once a day  Alcohol intake (oz): social     Intake (ounces) comment: Uses toilet seat covers   Feels dehydrated throughout the day  Bowel Function:     Voiding DIfficulties: urgency      Bowel Function comments:  IBS-C&D -see GI due to h/o colon cancer in family  Takes fiber supplement   She had a cholecystectomy on 6 weeks ago     Bowel frequency: daily    [place] Stool Scale: type 5 and type 4  Sexual Function:     Sexually Active:  Non-contributory  Pain:     Current pain ratin    At best pain ratin    At worst pain ratin    Location:  Vaginal during insertion  Diagnostic Tests:     None    Treatments:     Previous treatment:  Physical therapyno  Patient Goals:     Patient goals for therapy:  Improved quality of life, fully empty bladder or bowels and improved bladder or bowel function      Objective   Pelvic Floor Exam     General Perineum Exam:   Positive for swelling       General perineum exam comments: No discharge, irritation, organ prolapse or skin breakdown evident      Visual Inspection of Perineum:   Excursion of perineal body in cephalad direction with contraction of pelvic floor muscles (PFM): fair   Excursion of perineal body in caudal direction with relaxation of pelvic floor muscles (PFM): fair   Involuntary contraction with coughing: yes  Involuntary relaxation with bearing down: yes  Cotton swab test: non-tender  Cough reflex: cough reflex  Sphincter Tone Resting: normal  Sphincter Tone Squeeze: normal  Sensation: intact    Pelvic Organ Prolapse   no pelvic organ prolapse    Pelvic Floor Muscle Exam     Palpation   Minimal increased muscle tension in the super transverse perineal, coccygeus and obturator internus Comment: ~75% decreased active muscle engagement  No tenderness on right in the super transverse perineal  Minimal tenderness on right in the coccygeus and obturator internus Comment: ~ 25% decreased active muscle contraction  Minimal tenderness on left in the super transverse perineal, coccygeus and obturator internus  Moderate tenderness on left in the coccygeus and obturator internus    Muscle Contraction: well isolated  Breathing pattern with contraction: within normal limits  Pelvic floor muscle relaxation is incomplete  25% pelvic floor relaxation    PERFECT Score   Power right: 3/5  Power left: 3/5  Endurance (seconds to max): 6  Repetitions (before fatigue): 6    SMEG Biofeedback   to be assessed next treatment               Precautions: standard    Pt reports being discouraged in the last few days, she has experienced 2 episodes of uncontrollable urine leakage  Second time occurred just before today's session when she got to the parking lot  It was preceeded by some urgency symptoms, but then every time she tried to get out of the car she would leak more  Pt is unsure what is causing this, wonders if her diazepam medication whish she still uses is making her muscles too relax now  Objective: See treatment diary below    Assessment: Tolerated treatment well  Patient would benefit from continued PT  Discussed with Fred Golden she may benefit from discussing with the urologist who prescribed the medication how to ween herself off  Minimal discomfort reproduced with PFM stretching and appears to be able to contract and relax well  Plan: Continue per plan of care  Precautions: standard      Manuals 3/22 3/31 4/14 4/26 5/3 5/09 5/24 6/1 6/9 6/14 6/21    Gentle downtraining nv    10'      10'    Left intercostal release b/w ribs 11-12  5' 5'            VM   40' 10'   20'        C/R/RE   10'   5'         Breath ed + PFM (she's had previous PT)     13'          Bladder mobilization     x10' 10' 10' 10'        Gentle PFM release     x20 20' 25' incl   periurethral & compressor urethra release 25' 23' + cueing  15' 20'    Bowel massage      10         MET & piriformis         15'      REEVAL           15' Ther Ex               Neuro               biofeedack         20' h      Reformer:   Hip flexor strechx3 each   x3  x3       Reformer standing        1B reverse and side lunges 1B reverse and side lunges 1B reverse and side lunges 1B reverse and side lunges    Reformer supine        1B1W heels together, heels at th end f thebar, adductors, bridges, bridges with leg raise  1B1W heels together, heels at th end f thebar, adductors, bridges, bridges with leg raise  1B1W heels together, heels at th end f thebar, adductors, bridges, bridges with leg raise  1B1W heels together, heels at th end f thebar, adductors, bridges, bridges with leg raise     Reformer quadriped         1B wheelbarrow 1B wheelbarrow     Reformer 90 position         1B frog to straight leg x 10 1B frog to straight leg x 10     Reformer         1B adductors/signle adductor "V"  1B adductors/signle adductor "V"      PFM C/R with focus on relax in sitting    5x10 - fwd shift for anterior bias           Prone press ups     5' 5x w/ OP         Happy baby      2'                                                      Ther Activity               education anatomy and POC x10'   Self stretches w/ dilator, breath focus x8'           Urge suppression  10'             Gait Training                                             Modalities               biofeedback  40'

## 2022-06-28 ENCOUNTER — OFFICE VISIT (OUTPATIENT)
Dept: PHYSICAL THERAPY | Facility: REHABILITATION | Age: 32
End: 2022-06-28
Payer: COMMERCIAL

## 2022-06-28 ENCOUNTER — HOSPITAL ENCOUNTER (OUTPATIENT)
Dept: RADIOLOGY | Age: 32
Discharge: HOME | End: 2022-06-28
Payer: COMMERCIAL

## 2022-06-28 ENCOUNTER — TELEPHONE (OUTPATIENT)
Dept: UROLOGY | Facility: AMBULATORY SURGERY CENTER | Age: 32
End: 2022-06-28

## 2022-06-28 ENCOUNTER — OFFICE VISIT (OUTPATIENT)
Dept: UROLOGY | Facility: AMBULATORY SURGERY CENTER | Age: 32
End: 2022-06-28
Payer: COMMERCIAL

## 2022-06-28 VITALS
HEART RATE: 108 BPM | OXYGEN SATURATION: 98 % | HEIGHT: 62 IN | DIASTOLIC BLOOD PRESSURE: 68 MMHG | BODY MASS INDEX: 24.48 KG/M2 | WEIGHT: 133 LBS | SYSTOLIC BLOOD PRESSURE: 118 MMHG

## 2022-06-28 DIAGNOSIS — N39.0 RECURRENT UTI: ICD-10-CM

## 2022-06-28 DIAGNOSIS — M62.89 PELVIC FLOOR TENSION: ICD-10-CM

## 2022-06-28 DIAGNOSIS — N39.46 MIXED STRESS AND URGE URINARY INCONTINENCE: ICD-10-CM

## 2022-06-28 DIAGNOSIS — N39.46 MIXED STRESS AND URGE URINARY INCONTINENCE: Primary | ICD-10-CM

## 2022-06-28 DIAGNOSIS — N32.81 OAB (OVERACTIVE BLADDER): Primary | ICD-10-CM

## 2022-06-28 DIAGNOSIS — N81.89 PELVIC FLOOR WEAKNESS: ICD-10-CM

## 2022-06-28 LAB — POST-VOID RESIDUAL VOLUME, ML POC: 47 ML

## 2022-06-28 PROCEDURE — 97110 THERAPEUTIC EXERCISES: CPT

## 2022-06-28 PROCEDURE — 99204 OFFICE O/P NEW MOD 45 MIN: CPT | Performed by: NURSE PRACTITIONER

## 2022-06-28 PROCEDURE — 51798 US URINE CAPACITY MEASURE: CPT | Performed by: NURSE PRACTITIONER

## 2022-06-28 PROCEDURE — 97140 MANUAL THERAPY 1/> REGIONS: CPT

## 2022-06-28 PROCEDURE — 76770 US EXAM ABDO BACK WALL COMP: CPT

## 2022-06-28 PROCEDURE — 97112 NEUROMUSCULAR REEDUCATION: CPT

## 2022-06-28 RX ORDER — OXYBUTYNIN CHLORIDE 5 MG/1
5 TABLET, EXTENDED RELEASE ORAL DAILY
Qty: 30 TABLET | Refills: 3 | Status: SHIPPED | OUTPATIENT
Start: 2022-06-28 | End: 2022-06-28 | Stop reason: SDUPTHER

## 2022-06-28 RX ORDER — OXYBUTYNIN CHLORIDE 5 MG/1
5 TABLET, EXTENDED RELEASE ORAL DAILY
Qty: 60 TABLET | Refills: 3 | Status: SHIPPED | OUTPATIENT
Start: 2022-06-28

## 2022-06-28 NOTE — PROGRESS NOTES
6/28/2022    Sisroc Waltonadarsh  1990  [id number]        Assessment  -Urinary urgency  -Mixed urinary incontinence    Discussion/Plan  Alma Rosa Miller is a 32 y o  female who presents in consultation    1  Urinary urgency, mixed urinary incontinence- PVR in the office today is 47 mL  I had a lengthy discussion with patient regarding her urinary symptoms  Recommend obtaining follow-up urine testing after completion of antibiotic  We also discussed obtaining a renal ultrasound to rule out any anatomical cause of her symptoms  Unfortunately, she is in the process of moving and will be losing her health insurance in the next 2 days  Renal ultrasound was ordered as stat and we will call with her results  We also discussed follow-up urodynamic testing to compare results after pelvic floor physical therapy  Again, she states she will be moving out of state and is unsure if she will follow-up  I also recommend trialing a low-dose oxybutynin 5 mg daily for management of her urinary urgency  This can be further managed by her future urologist   Reviewed dietary and behavioral modifications  Call with results of urine testing and renal ultrasound  She will call our office if she wishes to schedule an office follow-up  Patient was advised to call with any issues     -All questions answered, patients agree with plan     History of Present Illness  32 y o  female who presents in consultation today for evaluation of urinary urgency and mixed incontinence  She reports a longstanding history of urinary urgency since 2016  Patient had previously followed with a urologist at [facility] in 2020 and underwent cystoscopy as well as urodynamic testing  Patient states she was told she was not emptying her bladder and had a hyperactive bladder  Patient was prescribed diazepam   She denies any gross hematuria or dysuria  Patient has since been following with pelvic floor physical therapy    She has had a few episodes of complete urinary incontinence, but has noticed an improvement  Patient was recently diagnosed with a urinary tract infection and is currently on a course of Macrobid  Her last dose is tomorrow  Patient is currently in the process of moving and receiving her doctorate  She also states her brother passed away 1 year ago and she has been undergoing increased stress  Patient will be losing health insurance in 2 days  She underwent removal of her gallbladder 7 weeks ago laparoscopic and has an IUD in place for the last 7 years  She denies any significant gynecologic history  Patient primarily drinks coffee and water throughout the day  She denies any strong family history of bladder kidney malignancy  Review of Systems  Review of Systems   Constitutional: Negative  HENT: Negative  Respiratory: Negative  Cardiovascular: Negative  Gastrointestinal: Negative  Genitourinary: Positive for urgency  Negative for decreased urine volume, difficulty urinating, dysuria, flank pain, frequency and hematuria  Musculoskeletal: Negative  Skin: Negative  Neurological: Negative  Psychiatric/Behavioral: Negative  Past Medical History  Past Medical History:   Diagnosis Date    ADD (attention deficit disorder)     Anxiety     Asthma     seasonal, with URI, and cold weather excercise    Colon polyp     Epigastric pain     occasional nausea    Esophageal pain     occasional    Gastric hypertonus     GERD (gastroesophageal reflux disease)     occasional    Lymphadenopathy, inguinal     PONV (postoperative nausea and vomiting)     Raynaud's disease     Rectal bleeding        Past Social History  Past Surgical History:   Procedure Laterality Date    BUNIONECTOMY      COLONOSCOPY      NE LAP,CHOLECYSTECTOMY N/A 5/10/2022    Procedure: CHOLECYSTECTOMY LAPAROSCOPIC (possibly open);   Surgeon: Jia Mauricio DO;  Location: [facility];  Service: General    SALIVARY GLAND SURGERY      SUPERFICIAL LYMPH NODE BIOPSY / EXCISION Right     Right groin    UPPER GASTROINTESTINAL ENDOSCOPY         Past Family History  Family History   Problem Relation Age of Onset    Colon polyps Mother     No Known Problems Father     Colon cancer Maternal Grandfather        Past Social history  Social History     Socioeconomic History    Marital status: /Civil Union     Spouse name: Not on file    Number of children: Not on file    Years of education: Not on file    Highest education level: Not on file   Occupational History    Not on file   Tobacco Use    Smoking status: Current Some Day Smoker     Types: Cigars    Smokeless tobacco: Never Used    Tobacco comment: no cigar today   Vaping Use    Vaping Use: Former   Substance and Sexual Activity    Alcohol use:  Yes     Alcohol/week: 2 0 standard drinks     Types: 2 Glasses of wine per week     Comment: occasional    Drug use: Yes     Types: Marijuana     Comment: medical marijuana-flower not oil    Sexual activity: Yes   Other Topics Concern    Not on file   Social History Narrative    Not on file     Social Determinants of Health     Financial Resource Strain: Not on file   Food Insecurity: Not on file   Transportation Needs: Not on file   Physical Activity: Not on file   Stress: Not on file   Social Connections: Not on file   Intimate Partner Violence: Not on file   Housing Stability: Not on file       Current Medications  Current Outpatient Medications   Medication Sig Dispense Refill    Ascorbic Acid (VITAMIN C PO) Take by mouth in the morning        betamethasone valerate (LUXIQ) 0 12 % foam if needed        Calcium Carb-Cholecalciferol (CALCIUM+D3 PO) Take by mouth in the morning        famotidine (PEPCID) 20 mg tablet Take 1 tablet (20 mg total) by mouth 2 (two) times a day (Patient taking differently: Take 20 mg by mouth if needed) 120 tablet 1    ISOtretinoin (ACCUTANE) 20 MG capsule Take 20 mg by mouth every other day      lisdexamfetamine (VYVANSE) 30 MG capsule Take 30 mg by mouth every morning      nortriptyline (PAMELOR) 10 mg capsule Take 1 capsule (10 mg total) by mouth daily at bedtime 90 capsule 3    Omega-3 Fatty Acids (SV FISH OIL PO) Take by mouth in the morning        oxybutynin (DITROPAN-XL) 5 mg 24 hr tablet Take 1 tablet (5 mg total) by mouth daily 60 tablet 3    Probiotic Product (PROBIOTIC-10 PO) Take by mouth in the morning         No current facility-administered medications for this visit  Allergies  Allergies   Allergen Reactions    Cefaclor Hives     Hives around mouth       Past medical history, social history, family history, medications and allergies were reviewed  Vitals  Vitals:    06/28/22 0908   BP: 118/68   Pulse: (!) 108   SpO2: 98%   Weight: 60 3 kg (133 lb)   Height: 5' 2" (1 575 m)       Physical Exam  Physical Exam  Constitutional:       Appearance: Normal appearance  She is well-developed  HENT:      Head: Normocephalic  Eyes:      Pupils: Pupils are equal, round, and reactive to light  Pulmonary:      Effort: Pulmonary effort is normal    Abdominal:      Palpations: Abdomen is soft  Musculoskeletal:         General: Normal range of motion  Cervical back: Normal range of motion  Skin:     General: Skin is warm and dry  Neurological:      General: No focal deficit present  Mental Status: She is alert and oriented to person, place, and time  Psychiatric:         Mood and Affect: Mood normal          Behavior: Behavior normal          Thought Content:  Thought content normal          Judgment: Judgment normal          Results    I have personally reviewed all pertinent lab results and reviewed with patient  Lab Results   Component Value Date    CALCIUM 8 9 03/28/2022    K 3 9 03/28/2022    CO2 30 03/28/2022     03/28/2022    BUN 6 03/28/2022    CREATININE 0 75 03/28/2022     Lab Results   Component Value Date    WBC 5 32 03/28/2022    HGB 13 8 03/28/2022 HCT 40 6 03/28/2022    MCV 88 03/28/2022     03/28/2022     No results found for this or any previous visit (from the past 1 hour(s))

## 2022-06-28 NOTE — TELEPHONE ENCOUNTER
----- Message from [id number] Anderson Adamejnniffer sent at 6/28/2022 12:59 PM EDT -----  Patient seen in consultation this morning  Please inform patient results of renal ultrasound noted bladder wall thickening, may be due to underdistension or current UTI  Mild splenomegaly noted, which she can follow up with PCP  Will call with results of renal ultrasound  No follow up scheduled at this time, as she stated she was moving and losing insurance in the next 2 days

## 2022-06-28 NOTE — TELEPHONE ENCOUNTER
Spoke to patient to advise of AP's note  Advised we will await for urine testing to be finalized and will call with the results  Patient is understanding

## 2022-06-28 NOTE — PROGRESS NOTES
Daily Note     Today's date: 2022  Patient name: Anaya Rod  : 1990  MRN: [id number]  Referring provider: Danilo Joe  Dx:   Encounter Diagnosis     ICD-10-CM    1  Mixed stress and urge urinary incontinence  N39 46    2  Pelvic floor tension  M62 89    3  Pelvic floor weakness  N81 89        Start Time: 1150  Stop Time: 1300  Total time in clinic (min): 70 minutes    Subjective: Pt was at the urologist office this morning and did have an US of her bladder and kidney  Was also given medication "oxybutynin 5 mg daily for management of her urinary urgency"  Objective: See treatment diary below    Access Code: [email or web address]  URL: [email or web address]  Date: 2022  Prepared by: Domnick Romberg    Program Notes   Do all exercises to fatigue, allow yourself to relax too  [place] albaro david [place] :)      Pilates ring at 5 below : $5 00      Exercises  · Sidelying Short Adductor Forearm Plank - 1 x daily - 7 x weekly - 3 sets - 10 reps  · Sidelying Long Adductor Forearm Plank - 1 x daily - 7 x weekly - 3 sets - 10 reps  · Supine Single Leg Bridge on Box - 1 x daily - 7 x weekly - 3 sets - 10 reps  · Supine Rotational Dead Bug with Pilates Ring - 1 x daily - 7 x weekly - 3 sets - 10 reps      Assessment: Tolerated treatment well  Patient would benefit from continued PT  Pt responds well to PFM stretching bilaterally prior to reformer and addition of more advanced core stability exercises  Pt reminded not to overdo it, perform her exercises to fatigue and allow her PFM to relax as well  Plan: Continue per plan of care        Precautions: standard    Manuals 3/22 3/31 4/14 4/26 5/3 5/09 5/24 6/1 6/9 6/14 6/21 6/28   Gentle downtraining nv    10'      10'    Left intercostal release b/w ribs 11-12  5' 5'            VM   40' 10'   20'        C/R/RE   10'   5'         Breath ed + PFM (she's had previous PT)     13'          Bladder mobilization     x10' 10' 10' 10' Gentle PFM release     x20 20' 25' incl   periurethral & compressor urethra release 25' 23' + cueing  15' 20' 25'   Bowel massage      10         MET & piriformis         15'      REEVAL           15'                   Ther Ex               Single leg bridge on box            10x   S/l short adductor forearm plank            To fatigue   S/l long adductor forearm plank            To fatigue   Supine rotational dead bug with pilates ring            To fatigue                  Neuro               biofeedack         20' h      Reformer:   Hip flexor strechx3 each   x3  x3       Reformer standing        1B reverse and side lunges 1B reverse and side lunges 1B reverse and side lunges 1B reverse and side lunges 1B reverse and side lunges   Reformer tall kneel            1B open arms    Reformer supine        1B1W heels together, heels at th end f thebar, adductors, bridges, bridges with leg raise  1B1W heels together, heels at th end f thebar, adductors, bridges, bridges with leg raise  1B1W heels together, heels at th end f thebar, adductors, bridges, bridges with leg raise  1B1W heels together, heels at th end f thebar, adductors, bridges, bridges with leg raise     Reformer quadriped         1B wheelbarrow 1B wheelbarrow  1B wheelbarrow   Reformer 90 position         1B frog to straight leg x 10 1B frog to straight leg x 10  1B frog to straight leg x 10   Reformer         1B adductors/signle adductor "V"  1B adductors/signle adductor "V"   1B adductors/signle adductor "V"   100's 5x10   PFM C/R with focus on relax in sitting    5x10 - fwd shift for anterior bias           Prone press ups     5' 5x w/ OP         Happy baby      2'                                                      Ther Activity               education anatomy and POC x10'   Self stretches w/ dilator, breath focus x8'           Urge suppression  10'             Gait Training                                             Modalities biofeedback  40'

## 2022-06-30 ENCOUNTER — APPOINTMENT (OUTPATIENT)
Dept: PHYSICAL THERAPY | Facility: REHABILITATION | Age: 32
End: 2022-06-30
Payer: COMMERCIAL

## 2022-06-30 NOTE — PROGRESS NOTES
Daily Note     Today's date: 2022  Patient name: Emery Irizarry  : 1990  MRN: [id number]  Referring provider: Sonia Saldaña  Dx:   Encounter Diagnosis     ICD-10-CM    1  Mixed stress and urge urinary incontinence  N39 46    2  Pelvic floor tension  M62 89    3  Pelvic floor weakness  N81 89                   Subjective: Pt was at the urologist office this morning and did have an US of her bladder and kidney  Was also given medication "oxybutynin 5 mg daily for management of her urinary urgency"  Objective: See treatment diary below    Access Code: [email or web address]  URL: [email or web address]  Date: 2022  Prepared by: Aishwarya Geronimo    Program Notes   Do all exercises to fatigue, allow yourself to relax too  Marquezen albaro david [place] :)      Pilates ring at 5 below : $5 00      Exercises  · Sidelying Short Adductor Forearm Plank - 1 x daily - 7 x weekly - 3 sets - 10 reps  · Sidelying Long Adductor Forearm Plank - 1 x daily - 7 x weekly - 3 sets - 10 reps  · Supine Single Leg Bridge on Box - 1 x daily - 7 x weekly - 3 sets - 10 reps  · Supine Rotational Dead Bug with Pilates Ring - 1 x daily - 7 x weekly - 3 sets - 10 reps      Assessment: Tolerated treatment well  Patient would benefit from continued PT  Pt responds well to PFM stretching bilaterally prior to reformer and addition of more advanced core stability exercises  Pt reminded not to overdo it, perform her exercises to fatigue and allow her PFM to relax as well  Plan: Continue per plan of care  Precautions: standard    Manuals 3/22 3/31 4/14 4/26 5/3 5/09 5/24 6/1 6/9 6/14 6/21 6/28   Gentle downtraining nv    10'      10'    Left intercostal release b/w ribs 11-12  5' 5'            VM   40' 10'   20'        C/R/RE   10'   5'         Breath ed + PFM (she's had previous PT)     13'          Bladder mobilization     x10' 10' 10' 10'        Gentle PFM release     x20 20' 25' incl   periurethral & compressor urethra release 25' 23' + cueing  15' 20' 25'   Bowel massage      10         MET & piriformis         15'      REEVAL           15'                   Ther Ex               Single leg bridge on box            10x   S/l short adductor forearm plank            To fatigue   S/l long adductor forearm plank            To fatigue   Supine rotational dead bug with pilates ring            To fatigue                  Neuro               biofeedack         20' h      Reformer:   Hip flexor strechx3 each   x3  x3       Reformer standing        1B reverse and side lunges 1B reverse and side lunges 1B reverse and side lunges 1B reverse and side lunges 1B reverse and side lunges   Reformer tall kneel            1B open arms    Reformer supine        1B1W heels together, heels at th end f thebar, adductors, bridges, bridges with leg raise  1B1W heels together, heels at th end f thebar, adductors, bridges, bridges with leg raise  1B1W heels together, heels at th end f thebar, adductors, bridges, bridges with leg raise  1B1W heels together, heels at th end f thebar, adductors, bridges, bridges with leg raise     Reformer quadriped         1B wheelbarrow 1B wheelbarrow  1B wheelbarrow   Reformer 90 position         1B frog to straight leg x 10 1B frog to straight leg x 10  1B frog to straight leg x 10   Reformer         1B adductors/signle adductor "V"  1B adductors/signle adductor "V"   1B adductors/signle adductor "V"   100's 5x10   PFM C/R with focus on relax in sitting    5x10 - fwd shift for anterior bias           Prone press ups     5' 5x w/ OP         Happy baby      2'                                                      Ther Activity               education anatomy and POC x10'   Self stretches w/ dilator, breath focus x8'           Urge suppression  10'             Gait Training                                             Modalities               biofeedback  40'

## 2022-07-18 NOTE — PROGRESS NOTES
Daily Note     Today's date: 2022  Patient name: Fany Domínguez  : 1990  MRN: [id number]  Referring provider: Alma Cho  Dx:   Encounter Diagnosis     ICD-10-CM    1  Mixed stress and urge urinary incontinence  N39 46    2  Pelvic floor tension  M62 89    3  Pelvic floor weakness  N81 89        Start Time: 1105          Subjective: Pt returns after 3 weeks due to [place]  She has been coughing more and noticing mild increase in LOIS as a result  Objective: See treatment diary below    Access Code: [email or web address]  URL: [email or web address]  Date: 2022  Prepared by: Sandralee Pipes    Program Notes   Do all exercises to fatigue, allow yourself to relax too  [place] albaro david [place] :)      Pilates ring at 5 below : $5 00      Exercises  · Sidelying Short Adductor Forearm Plank - 1 x daily - 7 x weekly - 3 sets - 10 reps  · Sidelying Long Adductor Forearm Plank - 1 x daily - 7 x weekly - 3 sets - 10 reps  · Supine Single Leg Bridge on Box - 1 x daily - 7 x weekly - 3 sets - 10 reps  · Supine Rotational Dead Bug with Pilates Ring - 1 x daily - 7 x weekly - 3 sets - 10 reps      Assessment: Tolerated treatment well  Patient would benefit from continued PT  Focused on MT and discussion of self management strategies going forward given likely discharge  She will be in [place] one more week but she will be busy packing and we anticipate that she will not be coming for any more session  I will likely discharge at the end of next week if she doesn't call us for any need for treatment  Reminded her of both fast and slow twitch training and she expressed understanding  Plan: As above        Precautions: standard                    Manuals 3/22 3/31 4/14 4/26 5/3 5/09 5/24 6/1 6/9 6/14 6/21 6/28 7/19   Gentle downtraining nv    10'      10'  5'   Left intercostal release b/w ribs 11-12  5' 5'             VM   40' 10'   20'         C/R/RE   10'   5'          Breath ed + PFM (she's had previous PT)     15'           Bladder mobilization     x10' 10' 10' 10'         Gentle PFM release     x20 20' 25' incl   periurethral & compressor urethra release 25' 23' + cueing  15' 20' 25' 12'   Fast and slow twitch training             5'   Bowel massage      10          MET & piriformis         15'       REEVAL           15'                     Ther Ex                Single leg bridge on box            10x    S/l short adductor forearm plank            To fatigue    S/l long adductor forearm plank            To fatigue    Supine rotational dead bug with pilates ring            To fatigue                    Neuro                biofeedack         20' h       Reformer:   Hip flexor strechx3 each   x3  x3        Reformer standing        1B reverse and side lunges 1B reverse and side lunges 1B reverse and side lunges 1B reverse and side lunges 1B reverse and side lunges    Reformer tall kneel            1B open arms     Reformer supine        1B1W heels together, heels at th end f thebar, adductors, bridges, bridges with leg raise  1B1W heels together, heels at th end f thebar, adductors, bridges, bridges with leg raise  1B1W heels together, heels at th end f thebar, adductors, bridges, bridges with leg raise  1B1W heels together, heels at th end f thebar, adductors, bridges, bridges with leg raise      Reformer quadriped         1B wheelbarrow 1B wheelbarrow  1B wheelbarrow    Reformer 90 position         1B frog to straight leg x 10 1B frog to straight leg x 10  1B frog to straight leg x 10    Reformer         1B adductors/signle adductor "V"  1B adductors/signle adductor "V"   1B adductors/signle adductor "V"   100's 5x10    PFM C/R with focus on relax in sitting    5x10 - fwd shift for anterior bias            Prone press ups     5' 5x w/ OP          Happy baby      2'                                                          Ther Activity                education anatomy and POC x10'   Self stretches w/ dilator, breath focus x8'            Urge suppression  10'              Gait Training                                                Modalities                biofeedback  40'

## 2022-07-19 ENCOUNTER — OFFICE VISIT (OUTPATIENT)
Dept: PHYSICAL THERAPY | Facility: REHABILITATION | Age: 32
End: 2022-07-19
Payer: COMMERCIAL

## 2022-07-19 DIAGNOSIS — M62.89 PELVIC FLOOR TENSION: ICD-10-CM

## 2022-07-19 DIAGNOSIS — N81.89 PELVIC FLOOR WEAKNESS: ICD-10-CM

## 2022-07-19 DIAGNOSIS — N39.46 MIXED STRESS AND URGE URINARY INCONTINENCE: Primary | ICD-10-CM

## 2022-07-19 PROCEDURE — 97140 MANUAL THERAPY 1/> REGIONS: CPT | Performed by: PHYSICAL THERAPIST

## 2022-08-26 ENCOUNTER — TELEPHONE (OUTPATIENT)
Dept: OTHER | Facility: OTHER | Age: 32
End: 2022-08-26

## 2022-08-26 NOTE — TELEPHONE ENCOUNTER
As per the patient's request a Medical Information Release Form was emailed to her @ [email or web address]

## 2022-08-30 ENCOUNTER — PATIENT MESSAGE (OUTPATIENT)
Dept: UROLOGY | Facility: AMBULATORY SURGERY CENTER | Age: 32
End: 2022-08-30

## 2022-08-30 DIAGNOSIS — N32.81 OAB (OVERACTIVE BLADDER): Primary | ICD-10-CM

## 2022-08-30 RX ORDER — OXYBUTYNIN CHLORIDE 10 MG/1
10 TABLET, EXTENDED RELEASE ORAL
Qty: 30 TABLET | Refills: 11 | Status: SHIPPED | OUTPATIENT
Start: 2022-08-30 | End: 2022-08-31 | Stop reason: SDUPTHER

## 2022-08-31 ENCOUNTER — TELEPHONE (OUTPATIENT)
Dept: UROLOGY | Facility: HOSPITAL | Age: 32
End: 2022-08-31

## 2022-08-31 DIAGNOSIS — N32.81 OAB (OVERACTIVE BLADDER): ICD-10-CM

## 2022-08-31 RX ORDER — OXYBUTYNIN CHLORIDE 10 MG/1
10 TABLET, EXTENDED RELEASE ORAL
Qty: 30 TABLET | Refills: 11 | Status: SHIPPED | OUTPATIENT
Start: 2022-08-31 | End: 2022-09-30

## 2022-08-31 NOTE — TELEPHONE ENCOUNTER
Regarding: Prescription Question  ----- Message from Lorena Del Toro RN sent at 8/31/2022  7:31 AM EDT -----       ----- Message from Rosette campa Esther Vero to [id number] Khan sent at 8/30/2022  9:03 PM -----   Thank you! I just picked up a new prescription today for 5mg but I can just take two tablets in the meantime  My new pharmacy is [facility] [address] [id number]  PT will be unlimited on my new insurance plan, which is going to be great  I will be going to a new pelvic floor PT in [facility], so Im hoping arvind change will occur  Should I take the medication at two different points during the day or can I continue by taking 10mg at night at one time? Thank you for your help and time  Best Regards,    Sis       ----- Message -----       BRICE East       Sent:8/30/2022  8:25 PM EDT         To: Sis Rivera    Subject:Prescription Question    Would recommend increasing oxybutynin dosage to 10mg daily  Will send new prescription to your pharmacy  I would still recommend following with pelvic floor physical therapy if new insurance allows! They can significantly help assess and manage urinary incontinence  ----- Message -----       From:Sis Rivera       Sent:8/30/2022  3:01 PM EDT         To:BRICE Nava    Subject:Prescription Question    Good Afternoon,    A few months ago, you prescribed me oxybutylin for my urge urinary incontinence symptoms  It has been helping, however i still get urge symptoms and leakage every day  I have not been able to do formal pelvic floor PT due to not having health insurance since June 30th,  but my health insurance is starting September 1st for my new job  I scheduled an appointment with a urologist for October in [facility], but in the meantime, should I take the oxybutylin only 1x a day? Or should I increase the dosage since my symptoms are still severe and life impacting to 2x a day?  I have been taking one dose of oxybutylin 5mg 1x a day before bed (between 10:30pm-12am)  Thank you for your help and time       Best Regards,    [address]

## (undated) DEVICE — NEEDLE 22 G X 1 1/2 SAFETY

## (undated) DEVICE — PROXIMATE PLUS MD MULTI-DIRECTIONAL RELEASE SKIN STAPLERS CONTAINS 35 STAINLESS STEEL STAPLES APPROXIMATE CLOSED DIMENSIONS: 6.9MM X 3.9MM WIDE: Brand: PROXIMATE

## (undated) DEVICE — INTENDED FOR TISSUE SEPARATION, AND OTHER PROCEDURES THAT REQUIRE A SHARP SURGICAL BLADE TO PUNCTURE OR CUT.: Brand: BARD-PARKER SAFETY BLADES SIZE 15, STERILE

## (undated) DEVICE — ANTIBACTERIAL UNDYED BRAIDED (POLYGLACTIN 910), SYNTHETIC ABSORBABLE SUTURE: Brand: COATED VICRYL

## (undated) DEVICE — HARMONIC ACE 5MM DIAMETER SHEARS 36CM SHAFT LENGTH + ADAPTIVE TISSUE TECHNOLOGY FOR USE WITH GENERATOR G11: Brand: HARMONIC ACE

## (undated) DEVICE — TISSUE RETRIEVAL SYSTEM: Brand: INZII RETRIEVAL SYSTEM

## (undated) DEVICE — BETHLEHEM MAJOR GENERAL PACK: Brand: CARDINAL HEALTH

## (undated) DEVICE — POOLE SUCTION HANDLE: Brand: CARDINAL HEALTH

## (undated) DEVICE — 3000CC GUARDIAN II: Brand: GUARDIAN

## (undated) DEVICE — SUT VICRYL PLUS 0 UR-6 27IN VCP603H

## (undated) DEVICE — SUT VICRYL 3-0 SH 27 IN J416H

## (undated) DEVICE — TROCAR: Brand: KII FIOS FIRST ENTRY

## (undated) DEVICE — INTENDED FOR TISSUE SEPARATION, AND OTHER PROCEDURES THAT REQUIRE A SHARP SURGICAL BLADE TO PUNCTURE OR CUT.: Brand: BARD-PARKER SAFETY BLADES SIZE 11, STERILE

## (undated) DEVICE — 2000CC GUARDIAN II: Brand: GUARDIAN

## (undated) DEVICE — CHLORAPREP HI-LITE 26ML ORANGE

## (undated) DEVICE — PACK PBDS LAP CHOLE RF

## (undated) DEVICE — ADHESIVE SKIN HIGH VISCOSITY EXOFIN 1ML

## (undated) DEVICE — SUT MONOCRYL 4-0 PS-2 18 IN Y496G

## (undated) DEVICE — TUBING SMOKE EVAC W/FILTRATION DEVICE PLUMEPORT ACTIV

## (undated) DEVICE — TROCAR: Brand: KII® SLEEVE

## (undated) DEVICE — GLOVE SRG BIOGEL ORTHOPEDIC 8

## (undated) DEVICE — STRL COTTON TIP APPLCTR 6IN PK: Brand: CARDINAL HEALTH

## (undated) DEVICE — PLUMEPEN PRO 10FT

## (undated) DEVICE — LIGAMAX 5 MM ENDOSCOPIC MULTIPLE CLIP APPLIER: Brand: LIGAMAX

## (undated) DEVICE — ROSEBUD DISSECTORS: Brand: DEROYAL

## (undated) DEVICE — NEEDLE 23 G X 1 SAFETY

## (undated) DEVICE — INTENDED FOR TISSUE SEPARATION, AND OTHER PROCEDURES THAT REQUIRE A SHARP SURGICAL BLADE TO PUNCTURE OR CUT.: Brand: BARD-PARKER SAFETY BLADES SIZE 10, STERILE

## (undated) DEVICE — SCD SEQUENTIAL COMPRESSION COMFORT SLEEVE MEDIUM KNEE LENGTH: Brand: KENDALL SCD

## (undated) DEVICE — SUT PROLENE 1 CT-1 30 IN 8425H

## (undated) DEVICE — LIGACLIP MCA MULTIPLE CLIP APPLIERS, 20 MEDIUM CLIPS: Brand: LIGACLIP